# Patient Record
Sex: MALE | Race: OTHER | Employment: OTHER | URBAN - METROPOLITAN AREA
[De-identification: names, ages, dates, MRNs, and addresses within clinical notes are randomized per-mention and may not be internally consistent; named-entity substitution may affect disease eponyms.]

---

## 2019-04-04 ENCOUNTER — OFFICE VISIT (OUTPATIENT)
Dept: FAMILY MEDICINE CLINIC | Facility: CLINIC | Age: 69
End: 2019-04-04
Payer: MEDICARE

## 2019-04-04 VITALS
BODY MASS INDEX: 27.65 KG/M2 | DIASTOLIC BLOOD PRESSURE: 90 MMHG | SYSTOLIC BLOOD PRESSURE: 164 MMHG | RESPIRATION RATE: 17 BRPM | HEART RATE: 77 BPM | WEIGHT: 158 LBS | HEIGHT: 63.5 IN | OXYGEN SATURATION: 98 % | TEMPERATURE: 98 F

## 2019-04-04 DIAGNOSIS — Z12.11 SCREENING FOR COLON CANCER: ICD-10-CM

## 2019-04-04 DIAGNOSIS — I10 ESSENTIAL HYPERTENSION: ICD-10-CM

## 2019-04-04 DIAGNOSIS — E78.00 HYPERCHOLESTEROLEMIA: ICD-10-CM

## 2019-04-04 DIAGNOSIS — Z00.00 MEDICARE ANNUAL WELLNESS VISIT, SUBSEQUENT: Primary | ICD-10-CM

## 2019-04-04 DIAGNOSIS — Z12.5 PROSTATE CANCER SCREENING: ICD-10-CM

## 2019-04-04 PROCEDURE — 96160 PT-FOCUSED HLTH RISK ASSMT: CPT | Performed by: EMERGENCY MEDICINE

## 2019-04-04 PROCEDURE — 99397 PER PM REEVAL EST PAT 65+ YR: CPT | Performed by: EMERGENCY MEDICINE

## 2019-04-04 PROCEDURE — G0439 PPPS, SUBSEQ VISIT: HCPCS | Performed by: EMERGENCY MEDICINE

## 2019-04-04 RX ORDER — LISINOPRIL AND HYDROCHLOROTHIAZIDE 25; 20 MG/1; MG/1
1 TABLET ORAL DAILY
Qty: 30 TABLET | Refills: 0 | Status: SHIPPED | OUTPATIENT
Start: 2019-04-04 | End: 2019-04-25

## 2019-04-04 RX ORDER — ATORVASTATIN CALCIUM 10 MG/1
10 TABLET, FILM COATED ORAL DAILY
Qty: 90 TABLET | Refills: 0 | Status: SHIPPED | OUTPATIENT
Start: 2019-04-04 | End: 2019-10-21

## 2019-04-04 RX ORDER — ATORVASTATIN CALCIUM 10 MG/1
TABLET, FILM COATED ORAL
COMMUNITY
Start: 2018-11-09 | End: 2019-04-04

## 2019-04-04 RX ORDER — LISINOPRIL 10 MG/1
10 TABLET ORAL DAILY
Qty: 90 TABLET | Refills: 0 | Status: CANCELLED | OUTPATIENT
Start: 2019-04-04

## 2019-04-04 RX ORDER — LISINOPRIL 10 MG/1
TABLET ORAL
COMMUNITY
Start: 2018-11-19 | End: 2019-04-04

## 2019-04-04 NOTE — PROGRESS NOTES
HPI:   Rylan Tran is a 77year old male who presents for a MA (Medicare Advantage) [address] (Once per calendar year). HYPERTENSION  Ran out of meds. Prev on Lisinopril 10 mg daily. No home BP monitoring. No cough.   No meds x 1 month  No couch  No C labs)   No results found for: WBC, HGB, PLT     ALLERGIES:   He is allergic to seasonal.    CURRENT MEDICATIONS:     Outpatient Medications Marked as Taking for the 4/4/19 encounter (Office Visit) with Azeem Perez MD:  atorvastatin 10 MG Oral Tab Ta midline, mucosa normal, no drainage or sinus tenderness   Throat: Lips, mucosa, and tongue normal; teeth and gums normal   Neck: Supple, symmetrical, trachea midline, no adenopathy, thyroid: not enlarged, symmetric, no tenderness/mass/nodules, no carotid b a day and record  Recommend SHINGRIX vaccine for shingles x 2 doses, check with local pharmacy  Have blood tests done  Follow up in 2-3 weeks for recheck of BP  Low salt diet        The patient indicates understanding of these issues and agrees to the plan Influenza  Covered Annually    Please get every year    Pneumococcal 13 (Prevnar)  Covered Once after 65 No vaccine history found Please get once after your 65th birthday    Pneumococcal 23 (Pneumovax)  Covered Once after 65 No vaccine history found Please

## 2019-04-04 NOTE — PATIENT INSTRUCTIONS
Thank you for choosing [facility]  To Do:  FOR MARCIN [address] for Colonoscopy, Dr. Facundo Iyer BP monitoring once a day and record  Recommend [facility] vaccine for shingles x 2 doses, check with local pharmacy  Have blood tests done sigmoidoscopy every 5 years, or colonoscopy every 10 years, or double-contrast barium enema every 5 years; yearly fecal occult blood test or fecal immunochemical test; or a stool DNA test as often as your healthcare provider advises; talk with your healthc Hepatitis A Men at increased risk for infection – talk with your healthcare provider 2 doses given at least 6 months apart   Hepatitis B Men at increased risk for infection – talk with your healthcare provider 3 doses over 6 months; second dose should be

## 2019-04-08 ENCOUNTER — TELEPHONE (OUTPATIENT)
Dept: FAMILY MEDICINE CLINIC | Facility: CLINIC | Age: 69
End: 2019-04-08

## 2019-04-08 NOTE — TELEPHONE ENCOUNTER
Patient signed medical records authorization form for the below Facility to disclose health information to [facility]:      [facility] Phone: [phone]  Facility Fax: [phone]    ELAINE sent to scanning.  Fax confirmation 4/5

## 2019-04-25 ENCOUNTER — OFFICE VISIT (OUTPATIENT)
Dept: FAMILY MEDICINE CLINIC | Facility: CLINIC | Age: 69
End: 2019-04-25
Payer: MEDICARE

## 2019-04-25 ENCOUNTER — LAB ENCOUNTER (OUTPATIENT)
Dept: LAB | Age: 69
End: 2019-04-25
Attending: EMERGENCY MEDICINE
Payer: MEDICARE

## 2019-04-25 VITALS
DIASTOLIC BLOOD PRESSURE: 60 MMHG | SYSTOLIC BLOOD PRESSURE: 128 MMHG | RESPIRATION RATE: 15 BRPM | HEART RATE: 81 BPM | BODY MASS INDEX: 27.12 KG/M2 | WEIGHT: 155 LBS | HEIGHT: 63.5 IN | OXYGEN SATURATION: 98 %

## 2019-04-25 DIAGNOSIS — I10 ESSENTIAL HYPERTENSION: ICD-10-CM

## 2019-04-25 DIAGNOSIS — I10 ESSENTIAL HYPERTENSION: Primary | ICD-10-CM

## 2019-04-25 DIAGNOSIS — Z12.5 PROSTATE CANCER SCREENING: ICD-10-CM

## 2019-04-25 DIAGNOSIS — Z23 NEED FOR VACCINATION FOR STREP PNEUMONIAE: ICD-10-CM

## 2019-04-25 DIAGNOSIS — E78.00 HYPERCHOLESTEROLEMIA: ICD-10-CM

## 2019-04-25 PROCEDURE — 90670 PCV13 VACCINE IM: CPT | Performed by: EMERGENCY MEDICINE

## 2019-04-25 PROCEDURE — 99213 OFFICE O/P EST LOW 20 MIN: CPT | Performed by: EMERGENCY MEDICINE

## 2019-04-25 PROCEDURE — 80061 LIPID PANEL: CPT

## 2019-04-25 PROCEDURE — 36415 COLL VENOUS BLD VENIPUNCTURE: CPT

## 2019-04-25 PROCEDURE — G0009 ADMIN PNEUMOCOCCAL VACCINE: HCPCS | Performed by: EMERGENCY MEDICINE

## 2019-04-25 PROCEDURE — 80053 COMPREHEN METABOLIC PANEL: CPT

## 2019-04-25 PROCEDURE — 84443 ASSAY THYROID STIM HORMONE: CPT

## 2019-04-25 PROCEDURE — 85025 COMPLETE CBC W/AUTO DIFF WBC: CPT

## 2019-04-25 RX ORDER — LISINOPRIL AND HYDROCHLOROTHIAZIDE 25; 20 MG/1; MG/1
1 TABLET ORAL DAILY
Qty: 90 TABLET | Refills: 1 | Status: SHIPPED | OUTPATIENT
Start: 2019-04-25 | End: 2019-10-21

## 2019-04-25 NOTE — PATIENT INSTRUCTIONS
Thank you for choosing [facility]  To Do:  FOR MARCIN KEENAN     · Follow up in 6 months  · Continue with BP monitoring 1-2 times a week or as needed  · Low salt diet  · Pneumonia shot today  · Flu shot in the fall  · Continue with Atorvastatin f actividad física de moderada a intensa. · Si le barraza dado nitroglicerina, asegúrese de llevarla cuando hace ejercicio.   · Si tiene un ataque de angina de pecho mientras está haciendo ejercicio, pare el ejercicio, tome la nitroglicerina y llame al médico. más que nada en los alimentos procesados. · Coma menos sodio (sal). Consumir demasiada sal puede subirle la presión arterial. Reduzca la cantidad de sodio que ingiere a 2,300 mg diarios o menos según le recomiende caraballo proveedor de Jefferson Hanks.  Patricia Mackey a Intente comer al [facility] porciones a la semana de algún pescado graso, amparo salmón, mary, caballa, [place] arcoíris y atún albacora (mclaughlin). Nilam tipo de pescados contienen ácidos grasos omega-3, los cuales son especialmente buenos para caraballo corazón.  L de christina. Colesterol  ¿Tiene nilam análisis otros nombres? Colesterol total en elizabeth, colesterol en angelo. ¿Qué es nilam análisis?   Nilam análisis mide la cantidad de colesterol que hay en caraballo elizabeth, para ayudar a acraballo proveedor de [employer] a d presión arterial johnny, diabetes o fumar cigarrillos. · Tiene nica afección que puede estar estrechamente vinculada con un alto nivel de colesterol, incluidas la diabetes; el alcoholismo; y las enfermedades de la tiroides, el hígado y los riñones.   · Sigue nivel normal de colesterol. Tenga en cuenta que caraballo riesgo de enfermedad cardíaca basado en caraballo nivel de colesterol total depende en gran medida de hari niveles de colesterol HDL y LDL, y de otros factores de riesgo.   El alto nivel de colesterol puede estar no necesitan receta y cualquier droga ilícita que pueda usar.    © 0924-3568 [employer] [address]. [address] [facility], [facility] [facility]. Todos los derechos reservados.  Esta información no pretende sustituir la atención médica profesional. Sólo caraballo aire caliente  · [place] o semillas sin sal  Evite comer:  · Tartas y pasteles  · Mezclas para postres ya preparadas  · Budines en conserva o empacados  · Pretzels, shraddha fritas, galletas saladas y nueces (a menos que la etiqueta indique que no contienen sa hace demasiada fuerza contra las vail de las arterias al viajar por los vasos sanguíneos. Eso daña el recubrimiento de las arterias. Por lo general, está en riesgo si tiene:  · Veronique presión arterial de 120/80 o superior.  Puede que caraballo médico le indique va sentimientos no ocasionan directamente nica enfermedad cardíaca, olivia afectan la christina en general y [address] calidad de rene.   Date Last Reviewed: 6/1/2013  © 9020-6426 [employer] [address]. 1407 [facility], [address] [place] [address] [place].  Todos los dere sanguíneos. Niveles normales de colesterol  Usted puede determinar cuál es caraballo nivel de lípidos mediante un análisis de Emmy. Un nivel alto de colesterol en elizabeth es un factor de riesgo para desarrollar nica enfermedad cardiovascular aterosclerótica.  Brandan Patton triglicéridos. · Resinas (llamadas también secuestradores de ácidos biliares). Las resinas promueven un aumento de la eliminación del colesterol a través del intestino.  Funcionan uniéndose con la bilis (nica sustancia que ayuda al organismo a digerir la co medicamentos se administran mediante nica inyección y se Citizen of the [place] para personas que tienen nica forma hereditaria de colesterol alto (hipercolesterolemia).  También se usan en personas a quienes les resulta difícil controlar el colesterol con otros medicament los efectos secundarios que le causen. Jan vez deba mariposa más de un medicamento para lograr las metas de colesterol que acuerde con caraballo proveedor. ¿Cómo mariposa los medicamentos para el colesterol?   [place] los medicamentos siguiendo exactamente las instruccion los ojos, visión borrosa, lizett musculares y problemas para respirar deben comunicarse al proveedor de atención médica de inmediato.   Nota: Si usted está embarazada o [place], dígaselo al proveedor de atención médica antes de mariposa medicamentos para de moderada a enérgica incluyen:  · Caminar a ritmo enérgico (entre katelyn y cuatro millas [entre unos cuatro y seis km] por hora)  · Trotar o correr  · Nadar o hacer actividad [place] en el agua  · Hacer senderismo  · Bailar  · Practicar artes marciales  · puede diagnosticar y tratar un problema de christina.

## 2019-04-25 NOTE — PROGRESS NOTES
Chief Complaint:   Patient presents with:  Blood Pressure: Med f/u     HPI:   This is a 77year old male     Recheck BP. On Lisinopril HCTZ. BP much improved. No new Sx. Feels well.    No blood tests to review, will be drawn today  NO dizziness or light hea edema      No results found for this or any previous visit (from the past 336 hour(s)).         ASSESSMENT AND PLAN:   Diagnoses and all orders for this visit:    Essential hypertension  · Much improved today with combination lisinopril hydrochlorothiazide

## 2019-04-29 ENCOUNTER — TELEPHONE (OUTPATIENT)
Dept: FAMILY MEDICINE CLINIC | Facility: CLINIC | Age: 69
End: 2019-04-29

## 2019-04-29 DIAGNOSIS — E78.00 HYPERCHOLESTEROLEMIA: ICD-10-CM

## 2019-04-29 DIAGNOSIS — N28.9 ABNORMAL KIDNEY FUNCTION: Primary | ICD-10-CM

## 2019-04-29 NOTE — TELEPHONE ENCOUNTER
Left detailed VM for pt regarding results and instructions listed below. Pt instructed to call back with any further questions/concerns. *pt provided with lab hours and scheduling number.

## 2019-04-29 NOTE — TELEPHONE ENCOUNTER
----- Message from Ismael Maya MD sent at 4/29/2019  9:27 AM CDT -----  Creatinine and GFR a little low  Repeat BMP in 1 month  Lipids stable, continue with atorvastatin  Rest of labs stable

## 2019-05-06 ENCOUNTER — OFFICE VISIT (OUTPATIENT)
Dept: SURGERY | Facility: CLINIC | Age: 69
End: 2019-05-06
Payer: MEDICARE

## 2019-05-06 VITALS
SYSTOLIC BLOOD PRESSURE: 122 MMHG | HEART RATE: 77 BPM | BODY MASS INDEX: 28.52 KG/M2 | HEIGHT: 62 IN | WEIGHT: 155 LBS | DIASTOLIC BLOOD PRESSURE: 86 MMHG

## 2019-05-06 DIAGNOSIS — Z12.11 ENCOUNTER FOR SCREENING COLONOSCOPY: Primary | ICD-10-CM

## 2019-05-06 DIAGNOSIS — I10 ESSENTIAL HYPERTENSION: ICD-10-CM

## 2019-05-06 PROCEDURE — S0285 CNSLT BEFORE SCREEN COLONOSC: HCPCS | Performed by: COLON & RECTAL SURGERY

## 2019-05-06 RX ORDER — POLYETHYLENE GLYCOL 3350, SODIUM CHLORIDE, SODIUM BICARBONATE, POTASSIUM CHLORIDE 420; 11.2; 5.72; 1.48 G/4L; G/4L; G/4L; G/4L
POWDER, FOR SOLUTION ORAL
Qty: 1 BOTTLE | Refills: 0 | Status: SHIPPED | OUTPATIENT
Start: 2019-05-06 | End: 2019-06-24 | Stop reason: ALTCHOICE

## 2019-05-06 NOTE — PATIENT INSTRUCTIONS
Assessment   Encounter for screening colonoscopy  (primary encounter diagnosis)  Essential hypertension      Plan   The patient is 77year old and has had a colonoscopy. The patient's family history is negative for Colorectal cancer.  The patient does not h

## 2019-05-06 NOTE — H&P
New Patient Visit Note       Active Problems      1. Encounter for screening colonoscopy    2.  Essential hypertension        Chief Complaint   Patient presents with:  Colonoscopy: No current complaints      History of Present Illness   Nena Skelton is a facility-administered medications on file prior to visit. Review of Systems  Review of Systems   Constitutional: Negative for chills, diaphoresis, fatigue and fever. HENT: Negative for rhinorrhea, sore throat and trouble swallowing.     Eyes: Delia Fuss diagnosis)  Essential hypertension      Plan   The patient is 77year old and has had a colonoscopy. The patient's family history is negative for Colorectal cancer. The patient does not have gastrointestinal symptoms.  The patient is at average risk for col

## 2019-05-28 ENCOUNTER — LAB REQUISITION (OUTPATIENT)
Dept: LAB | Facility: HOSPITAL | Age: 69
End: 2019-05-28
Payer: MEDICARE

## 2019-05-28 DIAGNOSIS — Z12.11 ENCOUNTER FOR SCREENING FOR MALIGNANT NEOPLASM OF COLON: ICD-10-CM

## 2019-05-28 PROCEDURE — 88305 TISSUE EXAM BY PATHOLOGIST: CPT | Performed by: COLON & RECTAL SURGERY

## 2019-05-31 NOTE — PROGRESS NOTES
Called pt to schedule an appt. Pt was out of the house and not by a calendar. States he will also be going out of town soon as well. Pt states he will call back later today once he gets home and has a calendar in front of him to make an appt.

## 2019-06-24 ENCOUNTER — OFFICE VISIT (OUTPATIENT)
Dept: SURGERY | Facility: CLINIC | Age: 69
End: 2019-06-24
Payer: MEDICARE

## 2019-06-24 VITALS
HEART RATE: 80 BPM | WEIGHT: 152 LBS | BODY MASS INDEX: 26.6 KG/M2 | SYSTOLIC BLOOD PRESSURE: 119 MMHG | TEMPERATURE: 98 F | HEIGHT: 63.5 IN | DIASTOLIC BLOOD PRESSURE: 73 MMHG

## 2019-06-24 DIAGNOSIS — K57.30 DIVERTICULOSIS OF LARGE INTESTINE WITHOUT HEMORRHAGE: ICD-10-CM

## 2019-06-24 DIAGNOSIS — K64.2 GRADE III INTERNAL HEMORRHOIDS: ICD-10-CM

## 2019-06-24 DIAGNOSIS — D12.6 TUBULAR ADENOMA OF COLON: Primary | ICD-10-CM

## 2019-06-24 PROCEDURE — 99212 OFFICE O/P EST SF 10 MIN: CPT | Performed by: COLON & RECTAL SURGERY

## 2019-06-24 NOTE — PATIENT INSTRUCTIONS
Assessment   Tubular adenoma of colon  (primary encounter diagnosis)  Diverticulosis of large intestine without hemorrhage  Grade III internal hemorrhoids      Plan   The patient is doing well after colonoscopy.     Findings include  ascending colon tubular

## 2019-06-24 NOTE — PROGRESS NOTES
Office Visit Note       Active Problems  1. Tubular adenoma of colon    2. Diverticulosis of large intestine without hemorrhage    3.  Grade III internal hemorrhoids         Chief Complaint   Patient presents with:  Test Results: No current complaints Eyes: Negative for visual disturbance. Respiratory: Negative for shortness of breath and wheezing. Cardiovascular: Negative for chest pain and palpitations.    Gastrointestinal: Negative for abdominal distention, abdominal pain, anal bleeding, blood Ordering Location:     [employer]        Received:            05/29/2019 08:39 AM         Pathologist:          Maritza Bhatt MD                                                       Specimens:   A) - Appendix, Martha appendiceal poly

## 2019-09-25 ENCOUNTER — PATIENT OUTREACH (OUTPATIENT)
Dept: CASE MANAGEMENT | Age: 69
End: 2019-09-25

## 2019-09-25 NOTE — PROGRESS NOTES
Attempted contacting pt to intro CCM services with no answer left voicemail to call back. Will continue contacting to discuss.

## 2019-10-21 ENCOUNTER — OFFICE VISIT (OUTPATIENT)
Dept: FAMILY MEDICINE CLINIC | Facility: CLINIC | Age: 69
End: 2019-10-21
Payer: MEDICARE

## 2019-10-21 VITALS
OXYGEN SATURATION: 97 % | RESPIRATION RATE: 17 BRPM | HEART RATE: 79 BPM | BODY MASS INDEX: 27 KG/M2 | DIASTOLIC BLOOD PRESSURE: 84 MMHG | WEIGHT: 153 LBS | SYSTOLIC BLOOD PRESSURE: 138 MMHG

## 2019-10-21 DIAGNOSIS — E78.00 HYPERCHOLESTEROLEMIA: Primary | ICD-10-CM

## 2019-10-21 DIAGNOSIS — Z23 NEEDS FLU SHOT: ICD-10-CM

## 2019-10-21 DIAGNOSIS — I10 ESSENTIAL HYPERTENSION: ICD-10-CM

## 2019-10-21 PROCEDURE — 90662 IIV NO PRSV INCREASED AG IM: CPT | Performed by: EMERGENCY MEDICINE

## 2019-10-21 PROCEDURE — 99214 OFFICE O/P EST MOD 30 MIN: CPT | Performed by: EMERGENCY MEDICINE

## 2019-10-21 PROCEDURE — G0008 ADMIN INFLUENZA VIRUS VAC: HCPCS | Performed by: EMERGENCY MEDICINE

## 2019-10-21 RX ORDER — ATORVASTATIN CALCIUM 10 MG/1
10 TABLET, FILM COATED ORAL DAILY
Qty: 90 TABLET | Refills: 0 | Status: SHIPPED | OUTPATIENT
Start: 2019-10-21 | End: 2020-02-12

## 2019-10-21 RX ORDER — LISINOPRIL AND HYDROCHLOROTHIAZIDE 25; 20 MG/1; MG/1
1 TABLET ORAL DAILY
Qty: 90 TABLET | Refills: 1 | Status: SHIPPED | OUTPATIENT
Start: 2019-10-21 | End: 2020-02-12

## 2019-10-21 NOTE — PATIENT INSTRUCTIONS
Thank you for choosing [facility]  To Do:  FOR MARCIN KEENAN    · Follow up in 6 months  · Continue with all medications  · Home blood pressure checks twice a week and record  · Have blood test done before next office visit.   · Recommend [facility], [address] [employer] [place]. All rights reserved. This information is not intended as a substitute for professional medical care. Always follow your healthcare professional's instructions.         Step-by-Step  Checking Your Blood Pressure These can contain a lot of salt. · Eat 8 to 10 servings of fruits and vegetables every day. · Choose lean meats, fish, or chicken. · Eat whole-grain pasta, brown rice, and beans. · Eat 2 to 3 servings of low-fat or fat-free dairy products.   · Ask your healthcare provider may prescribe high blood pressure medicine. Take all medicines as prescribed. If you have any questions about your medicines, ask your healthcare provider before stopping or changing them.   Date Last Reviewed: 4/27/2016 © 2000-2019 The medicine exactly as directed. Don’t skip doses. Missing doses can cause your blood pressure to get out of control. · If you do miss a dose (or doses) check with your healthcare provider about what to do.   · Don't take medicines that contain heart stimulan manage stress. · Limit alcohol to no more than 1 drink a day for women and 2 drinks a day for men. Follow-up care  Make a follow-up appointment as directed.   When to call your healthcare provider  Call your healthcare provider right away if you have any

## 2019-10-21 NOTE — PROGRESS NOTES
Chief Complaint:   Patient presents with:  Blood Pressure: F/u     HPI:   This is a 68year old male     HYPERTENSION  No home BP checks. On Lisinopril/HCTZ. + good compliance.    No meds x 1 week d/t no refilll  NOn smoker    HYPERCHOLESTEROLEMIA  On swetha clear to auscultation, no RRW  CARDIO: RRR without murmur  EXTREMITIES: no cyanosis, clubbing or edema  GI:soft Nontender Normoactive BS. No palpable masses no guarding rigidity no rebound tenderness    ASSESSMENT AND PLAN:     1.  Hypercholesterolemia  Drena Gallery

## 2020-01-22 ENCOUNTER — TELEPHONE (OUTPATIENT)
Dept: FAMILY MEDICINE CLINIC | Facility: CLINIC | Age: 70
End: 2020-01-22

## 2020-02-12 DIAGNOSIS — I10 ESSENTIAL HYPERTENSION: ICD-10-CM

## 2020-02-12 DIAGNOSIS — E78.00 HYPERCHOLESTEROLEMIA: ICD-10-CM

## 2020-02-12 RX ORDER — LISINOPRIL AND HYDROCHLOROTHIAZIDE 25; 20 MG/1; MG/1
1 TABLET ORAL DAILY
Qty: 90 TABLET | Refills: 0 | Status: SHIPPED | OUTPATIENT
Start: 2020-02-12 | End: 2020-07-16

## 2020-02-12 RX ORDER — ATORVASTATIN CALCIUM 10 MG/1
10 TABLET, FILM COATED ORAL DAILY
Qty: 90 TABLET | Refills: 0 | Status: SHIPPED | OUTPATIENT
Start: 2020-02-12 | End: 2020-07-16

## 2020-02-12 NOTE — TELEPHONE ENCOUNTER
Last office visit:  10/21/19    Appointment scheduled with: Dr. Ida Rosa  on  04/21/2020    Requested medication: Lisinopril-hydroCHLOROthiazide 20-25 MG Oral Tab and atorvastatin 10 MG Oral Tab    Pharmacy:[facility] - [address]

## 2020-03-10 ENCOUNTER — APPOINTMENT (OUTPATIENT)
Dept: LAB | Age: 70
End: 2020-03-10
Attending: EMERGENCY MEDICINE
Payer: MEDICARE

## 2020-03-10 DIAGNOSIS — E78.00 HYPERCHOLESTEROLEMIA: ICD-10-CM

## 2020-03-10 DIAGNOSIS — N28.9 ABNORMAL KIDNEY FUNCTION: ICD-10-CM

## 2020-03-10 LAB
ALBUMIN SERPL-MCNC: 3.8 G/DL (ref 3.4–5)
ALBUMIN/GLOB SERPL: 0.9 {RATIO} (ref 1–2)
ALP LIVER SERPL-CCNC: 78 U/L (ref 45–117)
ALT SERPL-CCNC: 21 U/L (ref 16–61)
ANION GAP SERPL CALC-SCNC: 5 MMOL/L (ref 0–18)
AST SERPL-CCNC: 14 U/L (ref 15–37)
BILIRUB SERPL-MCNC: 0.5 MG/DL (ref 0.1–2)
BUN BLD-MCNC: 23 MG/DL (ref 7–18)
BUN/CREAT SERPL: 14.6 (ref 10–20)
CALCIUM BLD-MCNC: 9.1 MG/DL (ref 8.5–10.1)
CHLORIDE SERPL-SCNC: 102 MMOL/L (ref 98–112)
CHOLEST SMN-MCNC: 166 MG/DL (ref ?–200)
CO2 SERPL-SCNC: 28 MMOL/L (ref 21–32)
CREAT BLD-MCNC: 1.58 MG/DL (ref 0.7–1.3)
GLOBULIN PLAS-MCNC: 4.3 G/DL (ref 2.8–4.4)
GLUCOSE BLD-MCNC: 91 MG/DL (ref 70–99)
HDLC SERPL-MCNC: 43 MG/DL (ref 40–59)
LDLC SERPL CALC-MCNC: 67 MG/DL (ref ?–100)
M PROTEIN MFR SERPL ELPH: 8.1 G/DL (ref 6.4–8.2)
NONHDLC SERPL-MCNC: 123 MG/DL (ref ?–130)
OSMOLALITY SERPL CALC.SUM OF ELEC: 283 MOSM/KG (ref 275–295)
PATIENT FASTING Y/N/NP: YES
PATIENT FASTING Y/N/NP: YES
POTASSIUM SERPL-SCNC: 4.5 MMOL/L (ref 3.5–5.1)
SODIUM SERPL-SCNC: 135 MMOL/L (ref 136–145)
TRIGL SERPL-MCNC: 278 MG/DL (ref 30–149)
VLDLC SERPL CALC-MCNC: 56 MG/DL (ref 0–30)

## 2020-03-10 PROCEDURE — 80053 COMPREHEN METABOLIC PANEL: CPT

## 2020-03-10 PROCEDURE — 80061 LIPID PANEL: CPT

## 2020-03-10 PROCEDURE — 36415 COLL VENOUS BLD VENIPUNCTURE: CPT

## 2020-03-11 ENCOUNTER — TELEPHONE (OUTPATIENT)
Dept: FAMILY MEDICINE CLINIC | Facility: CLINIC | Age: 70
End: 2020-03-11

## 2020-03-11 NOTE — TELEPHONE ENCOUNTER
----- Message from Raven Story MD sent at 3/10/2020  5:46 PM CDT -----  Labs stable  Continue statin  Pt due for 6 month OV

## 2020-03-11 NOTE — TELEPHONE ENCOUNTER
Left message to inform patient of results and instructions. Asked patient to call office if any questions.

## 2020-04-17 DIAGNOSIS — E78.00 HYPERCHOLESTEROLEMIA: ICD-10-CM

## 2020-04-17 RX ORDER — ATORVASTATIN CALCIUM 10 MG/1
TABLET, FILM COATED ORAL
Qty: 90 TABLET | Refills: 0 | OUTPATIENT
Start: 2020-04-17

## 2020-04-20 ENCOUNTER — TELEPHONE (OUTPATIENT)
Dept: FAMILY MEDICINE CLINIC | Facility: CLINIC | Age: 70
End: 2020-04-20

## 2020-07-16 ENCOUNTER — OFFICE VISIT (OUTPATIENT)
Dept: FAMILY MEDICINE CLINIC | Facility: CLINIC | Age: 70
End: 2020-07-16
Payer: MEDICARE

## 2020-07-16 VITALS
TEMPERATURE: 98 F | WEIGHT: 147 LBS | OXYGEN SATURATION: 97 % | HEART RATE: 82 BPM | BODY MASS INDEX: 26.05 KG/M2 | DIASTOLIC BLOOD PRESSURE: 64 MMHG | RESPIRATION RATE: 16 BRPM | HEIGHT: 63 IN | SYSTOLIC BLOOD PRESSURE: 118 MMHG

## 2020-07-16 DIAGNOSIS — Z23 NEED FOR VACCINATION: ICD-10-CM

## 2020-07-16 DIAGNOSIS — N52.9 ERECTILE DYSFUNCTION, UNSPECIFIED ERECTILE DYSFUNCTION TYPE: ICD-10-CM

## 2020-07-16 DIAGNOSIS — I10 ESSENTIAL HYPERTENSION: ICD-10-CM

## 2020-07-16 DIAGNOSIS — Z13.228 SCREENING FOR ENDOCRINE, NUTRITIONAL, METABOLIC AND IMMUNITY DISORDER: ICD-10-CM

## 2020-07-16 DIAGNOSIS — Z13.29 SCREENING FOR ENDOCRINE, NUTRITIONAL, METABOLIC AND IMMUNITY DISORDER: ICD-10-CM

## 2020-07-16 DIAGNOSIS — Z00.00 ENCOUNTER FOR ANNUAL HEALTH EXAMINATION: Primary | ICD-10-CM

## 2020-07-16 DIAGNOSIS — N18.30 CKD (CHRONIC KIDNEY DISEASE) STAGE 3, GFR 30-59 ML/MIN (HCC): Chronic | ICD-10-CM

## 2020-07-16 DIAGNOSIS — E78.00 HYPERCHOLESTEROLEMIA: ICD-10-CM

## 2020-07-16 DIAGNOSIS — R42 DIZZINESS: ICD-10-CM

## 2020-07-16 DIAGNOSIS — Z13.21 SCREENING FOR ENDOCRINE, NUTRITIONAL, METABOLIC AND IMMUNITY DISORDER: ICD-10-CM

## 2020-07-16 DIAGNOSIS — Z23 NEED FOR VACCINATION FOR STREP PNEUMONIAE: ICD-10-CM

## 2020-07-16 DIAGNOSIS — Z11.59 NEED FOR HEPATITIS C SCREENING TEST: ICD-10-CM

## 2020-07-16 DIAGNOSIS — Z13.0 SCREENING FOR ENDOCRINE, NUTRITIONAL, METABOLIC AND IMMUNITY DISORDER: ICD-10-CM

## 2020-07-16 DIAGNOSIS — Z12.5 PROSTATE CANCER SCREENING: ICD-10-CM

## 2020-07-16 PROCEDURE — G0439 PPPS, SUBSEQ VISIT: HCPCS | Performed by: EMERGENCY MEDICINE

## 2020-07-16 PROCEDURE — 3008F BODY MASS INDEX DOCD: CPT | Performed by: EMERGENCY MEDICINE

## 2020-07-16 PROCEDURE — 90732 PPSV23 VACC 2 YRS+ SUBQ/IM: CPT | Performed by: EMERGENCY MEDICINE

## 2020-07-16 PROCEDURE — 3078F DIAST BP <80 MM HG: CPT | Performed by: EMERGENCY MEDICINE

## 2020-07-16 PROCEDURE — 99397 PER PM REEVAL EST PAT 65+ YR: CPT | Performed by: EMERGENCY MEDICINE

## 2020-07-16 PROCEDURE — G0009 ADMIN PNEUMOCOCCAL VACCINE: HCPCS | Performed by: EMERGENCY MEDICINE

## 2020-07-16 PROCEDURE — 3074F SYST BP LT 130 MM HG: CPT | Performed by: EMERGENCY MEDICINE

## 2020-07-16 PROCEDURE — 96160 PT-FOCUSED HLTH RISK ASSMT: CPT | Performed by: EMERGENCY MEDICINE

## 2020-07-16 RX ORDER — LISINOPRIL AND HYDROCHLOROTHIAZIDE 25; 20 MG/1; MG/1
1 TABLET ORAL DAILY
Qty: 90 TABLET | Refills: 0 | Status: CANCELLED | OUTPATIENT
Start: 2020-07-16

## 2020-07-16 RX ORDER — ATORVASTATIN CALCIUM 10 MG/1
10 TABLET, FILM COATED ORAL DAILY
Qty: 90 TABLET | Refills: 0 | Status: SHIPPED | OUTPATIENT
Start: 2020-07-16 | End: 2020-10-15

## 2020-07-16 RX ORDER — LISINOPRIL 20 MG/1
20 TABLET ORAL DAILY
Qty: 90 TABLET | Refills: 0 | Status: SHIPPED | OUTPATIENT
Start: 2020-07-16 | End: 2020-10-15

## 2020-07-16 NOTE — PATIENT INSTRUCTIONS
Thank you for choosing [facility]  To Do:  FOR MARCIN KEENAN        1. Stop hydrochlorothiazide. Take new prescriiption of Lisinopril 20 mg  2. Monitor BP daily and record  3. Follow up in 1 month for recheck  4.  Have blood tests done after fas No results found for this or any previous visit.  Limited to patients who meet one of the following criteria:   • Men who are 73-68 years old and have smoked more than 100 cigarettes in their lifetime   • Anyone with a family history    Colorectal Cancer Sc received Factor VIII or IX concentrates   Clients of institutions for the mentally retarded   Persons who live in the same house as a HepB virus carrier   Homosexual men   Illicit injectable drug abusers     Tetanus Toxoid- Only covered with a cut with met

## 2020-07-16 NOTE — PROGRESS NOTES
HPI:   Federico Jiménez is a 65year old male who presents for a Medicare Subsequent Annual Wellness visit (Pt already had Initial Annual Wellness).   Annual Physical due on 07/16/2021        HYPERTENSION  On Lisinpril/HCTZ  No home BP checks      HYPERCHO 152 lb (68.9 kg)     Last Cholesterol Labs:   Lab Results   Component Value Date    CHOLEST 166 03/10/2020    HDL 43 03/10/2020    LDL 67 03/10/2020    TRIG 278 (H) 03/10/2020          Last Chemistry Labs:   Lab Results   Component Value Date    AST 14 (L) without obvious abnormality, atraumatic   Eyes:  PERRL, conjunctiva/corneas clear, EOM's intact, both eyes   Ears:  Normal TM's and external ear canals, both ears   Nose: Nares normal, septum midline, mucosa normal, no drainage or sinus tenderness   Throat atorvastatin 10 MG Oral Tab; Take 1 tablet (10 mg total) by mouth daily. Dispense: 90 tablet; Refill: 0  - COMP METABOLIC PANEL (14); Future  - LIPID PANEL; Future    4. Essential hypertension  Stable. Will discontinue hydrochlorothiazide.   Continue lillian Good  How do you maintain positive mental well-being?: Visiting Family    This section provided for quick review of chart, separate sheet to patient  [address] Internal Lab or Procedure External Lab or Procedure   Diab with a cut with metal- TD and TDaP Not covered by Medicare Part B) No vaccine history found This may be covered with your prescription benefits, but Medicare does not cover unless Medically needed    Zoster   Not covered by Medicare Part B No vaccine histo

## 2020-08-10 ENCOUNTER — LAB ENCOUNTER (OUTPATIENT)
Dept: LAB | Age: 70
End: 2020-08-10
Attending: EMERGENCY MEDICINE
Payer: MEDICARE

## 2020-08-10 DIAGNOSIS — E78.00 HYPERCHOLESTEROLEMIA: ICD-10-CM

## 2020-08-10 DIAGNOSIS — Z13.228 SCREENING FOR ENDOCRINE, NUTRITIONAL, METABOLIC AND IMMUNITY DISORDER: ICD-10-CM

## 2020-08-10 DIAGNOSIS — Z11.59 NEED FOR HEPATITIS C SCREENING TEST: ICD-10-CM

## 2020-08-10 DIAGNOSIS — Z12.5 PROSTATE CANCER SCREENING: ICD-10-CM

## 2020-08-10 DIAGNOSIS — Z13.21 SCREENING FOR ENDOCRINE, NUTRITIONAL, METABOLIC AND IMMUNITY DISORDER: ICD-10-CM

## 2020-08-10 DIAGNOSIS — I10 ESSENTIAL HYPERTENSION: ICD-10-CM

## 2020-08-10 DIAGNOSIS — R42 DIZZINESS: ICD-10-CM

## 2020-08-10 DIAGNOSIS — Z13.29 SCREENING FOR ENDOCRINE, NUTRITIONAL, METABOLIC AND IMMUNITY DISORDER: ICD-10-CM

## 2020-08-10 DIAGNOSIS — Z13.0 SCREENING FOR ENDOCRINE, NUTRITIONAL, METABOLIC AND IMMUNITY DISORDER: ICD-10-CM

## 2020-08-10 LAB
ALBUMIN SERPL-MCNC: 3.9 G/DL (ref 3.4–5)
ALBUMIN/GLOB SERPL: 1 {RATIO} (ref 1–2)
ALP LIVER SERPL-CCNC: 70 U/L (ref 45–117)
ALT SERPL-CCNC: 18 U/L (ref 16–61)
ANION GAP SERPL CALC-SCNC: 3 MMOL/L (ref 0–18)
AST SERPL-CCNC: 18 U/L (ref 15–37)
BASOPHILS # BLD AUTO: 0.02 X10(3) UL (ref 0–0.2)
BASOPHILS NFR BLD AUTO: 0.3 %
BILIRUB SERPL-MCNC: 0.7 MG/DL (ref 0.1–2)
BUN BLD-MCNC: 16 MG/DL (ref 7–18)
BUN/CREAT SERPL: 13.4 (ref 10–20)
CALCIUM BLD-MCNC: 8.8 MG/DL (ref 8.5–10.1)
CHLORIDE SERPL-SCNC: 109 MMOL/L (ref 98–112)
CHOLEST SMN-MCNC: 179 MG/DL (ref ?–200)
CO2 SERPL-SCNC: 27 MMOL/L (ref 21–32)
COMPLEXED PSA SERPL-MCNC: 1.67 NG/ML (ref ?–4)
CREAT BLD-MCNC: 1.19 MG/DL (ref 0.7–1.3)
DEPRECATED RDW RBC AUTO: 47.4 FL (ref 35.1–46.3)
EOSINOPHIL # BLD AUTO: 0.09 X10(3) UL (ref 0–0.7)
EOSINOPHIL NFR BLD AUTO: 1.5 %
ERYTHROCYTE [DISTWIDTH] IN BLOOD BY AUTOMATED COUNT: 14.5 % (ref 11–15)
GLOBULIN PLAS-MCNC: 3.8 G/DL (ref 2.8–4.4)
GLUCOSE BLD-MCNC: 97 MG/DL (ref 70–99)
HCT VFR BLD AUTO: 39.6 % (ref 39–53)
HCV AB SERPL QL IA: NONREACTIVE
HDLC SERPL-MCNC: 61 MG/DL (ref 40–59)
HGB BLD-MCNC: 13.1 G/DL (ref 13–17.5)
IMM GRANULOCYTES # BLD AUTO: 0.01 X10(3) UL (ref 0–1)
IMM GRANULOCYTES NFR BLD: 0.2 %
LDLC SERPL CALC-MCNC: 97 MG/DL (ref ?–100)
LYMPHOCYTES # BLD AUTO: 1.5 X10(3) UL (ref 1–4)
LYMPHOCYTES NFR BLD AUTO: 25.7 %
M PROTEIN MFR SERPL ELPH: 7.7 G/DL (ref 6.4–8.2)
MCH RBC QN AUTO: 29.6 PG (ref 26–34)
MCHC RBC AUTO-ENTMCNC: 33.1 G/DL (ref 31–37)
MCV RBC AUTO: 89.4 FL (ref 80–100)
MONOCYTES # BLD AUTO: 0.53 X10(3) UL (ref 0.1–1)
MONOCYTES NFR BLD AUTO: 9.1 %
NEUTROPHILS # BLD AUTO: 3.69 X10 (3) UL (ref 1.5–7.7)
NEUTROPHILS # BLD AUTO: 3.69 X10(3) UL (ref 1.5–7.7)
NEUTROPHILS NFR BLD AUTO: 63.2 %
NONHDLC SERPL-MCNC: 118 MG/DL (ref ?–130)
OSMOLALITY SERPL CALC.SUM OF ELEC: 289 MOSM/KG (ref 275–295)
PATIENT FASTING Y/N/NP: YES
PATIENT FASTING Y/N/NP: YES
PLATELET # BLD AUTO: 150 10(3)UL (ref 150–450)
POTASSIUM SERPL-SCNC: 4 MMOL/L (ref 3.5–5.1)
RBC # BLD AUTO: 4.43 X10(6)UL (ref 3.8–5.8)
SODIUM SERPL-SCNC: 139 MMOL/L (ref 136–145)
TRIGL SERPL-MCNC: 107 MG/DL (ref 30–149)
TSI SER-ACNC: 1.41 MIU/ML (ref 0.36–3.74)
VLDLC SERPL CALC-MCNC: 21 MG/DL (ref 0–30)
WBC # BLD AUTO: 5.8 X10(3) UL (ref 4–11)

## 2020-08-10 PROCEDURE — 85025 COMPLETE CBC W/AUTO DIFF WBC: CPT

## 2020-08-10 PROCEDURE — 80061 LIPID PANEL: CPT

## 2020-08-10 PROCEDURE — 80053 COMPREHEN METABOLIC PANEL: CPT

## 2020-08-10 PROCEDURE — 84443 ASSAY THYROID STIM HORMONE: CPT

## 2020-08-10 PROCEDURE — 36415 COLL VENOUS BLD VENIPUNCTURE: CPT

## 2020-08-10 PROCEDURE — 86803 HEPATITIS C AB TEST: CPT

## 2020-08-13 ENCOUNTER — TELEPHONE (OUTPATIENT)
Dept: FAMILY MEDICINE CLINIC | Facility: CLINIC | Age: 70
End: 2020-08-13

## 2020-08-13 NOTE — TELEPHONE ENCOUNTER
----- Message from Beatrice Sagastume MD sent at 8/12/2020 10:11 PM CDT -----  All labs stable  Continue with statin  Follow up as directed

## 2020-08-17 ENCOUNTER — OFFICE VISIT (OUTPATIENT)
Dept: FAMILY MEDICINE CLINIC | Facility: CLINIC | Age: 70
End: 2020-08-17
Payer: MEDICARE

## 2020-08-17 VITALS
RESPIRATION RATE: 17 BRPM | BODY MASS INDEX: 26 KG/M2 | DIASTOLIC BLOOD PRESSURE: 74 MMHG | OXYGEN SATURATION: 97 % | TEMPERATURE: 97 F | WEIGHT: 148 LBS | HEART RATE: 78 BPM | SYSTOLIC BLOOD PRESSURE: 130 MMHG

## 2020-08-17 DIAGNOSIS — N52.9 ERECTILE DYSFUNCTION, UNSPECIFIED ERECTILE DYSFUNCTION TYPE: ICD-10-CM

## 2020-08-17 DIAGNOSIS — I10 ESSENTIAL HYPERTENSION: Primary | ICD-10-CM

## 2020-08-17 PROCEDURE — 99214 OFFICE O/P EST MOD 30 MIN: CPT | Performed by: EMERGENCY MEDICINE

## 2020-08-17 PROCEDURE — 3078F DIAST BP <80 MM HG: CPT | Performed by: EMERGENCY MEDICINE

## 2020-08-17 PROCEDURE — 3075F SYST BP GE 130 - 139MM HG: CPT | Performed by: EMERGENCY MEDICINE

## 2020-08-17 RX ORDER — SILDENAFIL 50 MG/1
TABLET, FILM COATED ORAL
Qty: 30 TABLET | Refills: 1 | Status: SHIPPED | OUTPATIENT
Start: 2020-08-17 | End: 2021-01-07

## 2020-08-17 NOTE — PROGRESS NOTES
Chief Complaint:   Patient presents with:  Blood Pressure: F/u     HPI:   This is a 65year old male         HYPERTENSION    Currently on lisinopril only. HCTZ d/c because of reports of dizziness.    States that the dizziness has resolved and he feels sig stated age, well groomed.   GENERAL: well developed, well nourished, well hydrated, no distress  SKIN: good skin turgor, no obvious rashes  HEENT: atraumatic, normocephalic, ears, nose and throat are clear  EYES: sclera non icteric bilateral  NECK: supple, Time: 08/10/20  9:50 AM   Result Value Ref Range    WBC 5.8 4.0 - 11.0 x10(3) uL    RBC 4.43 3.80 - 5.80 x10(6)uL    HGB 13.1 13.0 - 17.5 g/dL    HCT 39.6 39.0 - 53.0 %    .0 150.0 - 450.0 10(3)uL    MCV 89.4 80.0 - 100.0 fL    MCH 29.6 26.0 - 34.0

## 2020-08-17 NOTE — PATIENT INSTRUCTIONS
Thank you for choosing [address] [place] [address] Group  To Do:  FOR MARCIN KEENAN        1. Continue with all medications  2. MOnitor BP at home at least 1 x a week  3. Follow up in 5- 6 months for recheck BP  4.  Low salt diet                          Alimentos con secos o semillas sin sal  Evite comer lo siguiente:  · Tartas y pasteles  · Premezclas comerciales para postres  · Pizza  · Budines en conserva o envasados  · Pretzels, shraddha fritas, galletas saladas y tabatha secos (a menos que la etiqueta indique que no c medicamentos para la disfunción eréctil con otros tratamientos, a menos que caraballo proveedor le sugiera hacerlo. · No tome medicamentos para la disfunción eréctil con mayor frecuencia o en dosis más elevadas que lo indicado en la receta.   · Mencione a caraballo prov estos medicamentos. · Si tiene el corazón debilitado debido a un infarto o nica enfermedad cardíaca, y no ha tenido relaciones sexuales tierra algún tiempo, reanudarlas podría aumentar el esfuerzo que debe hacer caraballo corazón.  Hable con caraballo proveedor de atenc

## 2020-10-15 DIAGNOSIS — I10 ESSENTIAL HYPERTENSION: ICD-10-CM

## 2020-10-15 DIAGNOSIS — E78.00 HYPERCHOLESTEROLEMIA: ICD-10-CM

## 2020-10-15 RX ORDER — ATORVASTATIN CALCIUM 10 MG/1
10 TABLET, FILM COATED ORAL DAILY
Qty: 90 TABLET | Refills: 0 | Status: SHIPPED | OUTPATIENT
Start: 2020-10-15 | End: 2021-01-07

## 2020-10-15 RX ORDER — LISINOPRIL 20 MG/1
20 TABLET ORAL DAILY
Qty: 90 TABLET | Refills: 0 | Status: SHIPPED | OUTPATIENT
Start: 2020-10-15 | End: 2021-01-07 | Stop reason: ALTCHOICE

## 2020-10-15 RX ORDER — LISINOPRIL AND HYDROCHLOROTHIAZIDE 25; 20 MG/1; MG/1
TABLET ORAL
Qty: 90 TABLET | Refills: 0 | Status: SHIPPED | OUTPATIENT
Start: 2020-10-15 | End: 2021-01-07

## 2020-12-17 ENCOUNTER — OFFICE VISIT (OUTPATIENT)
Dept: FAMILY MEDICINE CLINIC | Facility: CLINIC | Age: 70
End: 2020-12-17
Payer: MEDICARE

## 2020-12-17 VITALS
BODY MASS INDEX: 27.6 KG/M2 | HEIGHT: 62 IN | HEART RATE: 84 BPM | OXYGEN SATURATION: 99 % | SYSTOLIC BLOOD PRESSURE: 143 MMHG | TEMPERATURE: 97 F | WEIGHT: 150 LBS | DIASTOLIC BLOOD PRESSURE: 87 MMHG | RESPIRATION RATE: 20 BRPM

## 2020-12-17 DIAGNOSIS — L42 PITYRIASIS ROSEA: Primary | ICD-10-CM

## 2020-12-17 PROCEDURE — 3077F SYST BP >= 140 MM HG: CPT | Performed by: NURSE PRACTITIONER

## 2020-12-17 PROCEDURE — 3079F DIAST BP 80-89 MM HG: CPT | Performed by: NURSE PRACTITIONER

## 2020-12-17 PROCEDURE — 99212 OFFICE O/P EST SF 10 MIN: CPT | Performed by: NURSE PRACTITIONER

## 2020-12-17 PROCEDURE — 3008F BODY MASS INDEX DOCD: CPT | Performed by: NURSE PRACTITIONER

## 2020-12-17 NOTE — PATIENT INSTRUCTIONS
Pityriasis Rosea  Pityriasis rosea is a type of skin rash. It starts with one large round or oval scaly patch called the herald patch, and then causes many more small patches. The rash most often appears on the chest, back, and belly.  It can take 1 to 2 · Signs of infection in the skin (increasing redness, drainage of pus, yellow-brown scabs)  · Fever or other symptoms of a new illness  Paula last reviewed this educational content on 7/1/2019  © 0859-1570 [employer] [address].  [facility] The rash should go away without treatment, but it can take 4 to 8 weeks or longer. You can treat your itching with any of these:   · Corticosteroid cream or ointment. You can apply this medicine to the rash 2 to 3 times a day, for 2 to 3 weeks.   · Wendi

## 2021-01-07 ENCOUNTER — OFFICE VISIT (OUTPATIENT)
Dept: FAMILY MEDICINE CLINIC | Facility: CLINIC | Age: 71
End: 2021-01-07
Payer: MEDICARE

## 2021-01-07 VITALS
WEIGHT: 153 LBS | SYSTOLIC BLOOD PRESSURE: 128 MMHG | OXYGEN SATURATION: 99 % | HEART RATE: 59 BPM | DIASTOLIC BLOOD PRESSURE: 82 MMHG | RESPIRATION RATE: 17 BRPM | BODY MASS INDEX: 28 KG/M2 | TEMPERATURE: 97 F

## 2021-01-07 DIAGNOSIS — N52.9 ERECTILE DYSFUNCTION, UNSPECIFIED ERECTILE DYSFUNCTION TYPE: ICD-10-CM

## 2021-01-07 DIAGNOSIS — I10 ESSENTIAL HYPERTENSION: ICD-10-CM

## 2021-01-07 DIAGNOSIS — E78.00 HYPERCHOLESTEROLEMIA: ICD-10-CM

## 2021-01-07 DIAGNOSIS — R21 ATYPICAL RASH: Primary | ICD-10-CM

## 2021-01-07 PROCEDURE — 3079F DIAST BP 80-89 MM HG: CPT | Performed by: EMERGENCY MEDICINE

## 2021-01-07 PROCEDURE — 3074F SYST BP LT 130 MM HG: CPT | Performed by: EMERGENCY MEDICINE

## 2021-01-07 PROCEDURE — 99214 OFFICE O/P EST MOD 30 MIN: CPT | Performed by: EMERGENCY MEDICINE

## 2021-01-07 RX ORDER — TRIAMCINOLONE ACETONIDE 5 MG/G
1 CREAM TOPICAL 2 TIMES DAILY
Qty: 30 G | Refills: 0 | Status: SHIPPED | OUTPATIENT
Start: 2021-01-07

## 2021-01-07 RX ORDER — LISINOPRIL AND HYDROCHLOROTHIAZIDE 25; 20 MG/1; MG/1
1 TABLET ORAL DAILY
Qty: 90 TABLET | Refills: 0 | Status: SHIPPED | OUTPATIENT
Start: 2021-01-07 | End: 2021-04-21

## 2021-01-07 RX ORDER — SILDENAFIL 50 MG/1
TABLET, FILM COATED ORAL
Qty: 30 TABLET | Refills: 1 | Status: SHIPPED | OUTPATIENT
Start: 2021-01-07 | End: 2021-06-09

## 2021-01-07 RX ORDER — ATORVASTATIN CALCIUM 10 MG/1
10 TABLET, FILM COATED ORAL DAILY
Qty: 90 TABLET | Refills: 0 | Status: SHIPPED | OUTPATIENT
Start: 2021-01-07 | End: 2021-04-21

## 2021-01-07 NOTE — PATIENT INSTRUCTIONS
Thank you for choosing [facility]  To Do:  FOR MARCIN KEENAN        1. Follow up with dermatology, Dr. Yuni Barba  2. Use cream to most itchy areas  3. Continue with all medications  4.  Have blood tests done before next visit in 1 month

## 2021-01-07 NOTE — PROGRESS NOTES
Chief Complaint:   Patient presents with:  Rash: F/u rash, not getting better     HPI:   This is a 65year old male       RASH  Complains of a rash for the past 3 to 4 weeks. Initially was seen at the walk-in clinic. Diagnosed with pityriasis rosacea. comprised of multiple papules and macules with large areas of excoriation.   HEENT: atraumatic, normocephalic, ears, nose and throat are clear  EYES: sclera non icteric bilateral  NECK: supple, no adenopathy, no thyromegaly  LUNGS: clear to auscultation, no

## 2021-01-12 ENCOUNTER — TELEPHONE (OUTPATIENT)
Dept: FAMILY MEDICINE CLINIC | Facility: CLINIC | Age: 71
End: 2021-01-12

## 2021-02-02 ENCOUNTER — MED REC SCAN ONLY (OUTPATIENT)
Dept: FAMILY MEDICINE CLINIC | Facility: CLINIC | Age: 71
End: 2021-02-02

## 2021-02-08 ENCOUNTER — TELEPHONE (OUTPATIENT)
Dept: FAMILY MEDICINE CLINIC | Facility: CLINIC | Age: 71
End: 2021-02-08

## 2021-02-08 ENCOUNTER — LAB ENCOUNTER (OUTPATIENT)
Dept: LAB | Age: 71
End: 2021-02-08
Attending: EMERGENCY MEDICINE
Payer: MEDICARE

## 2021-02-08 DIAGNOSIS — E78.00 HYPERCHOLESTEROLEMIA: Primary | ICD-10-CM

## 2021-02-08 DIAGNOSIS — E78.00 HYPERCHOLESTEROLEMIA: ICD-10-CM

## 2021-02-08 LAB
ALBUMIN SERPL-MCNC: 4.1 G/DL (ref 3.4–5)
ALBUMIN/GLOB SERPL: 1.1 {RATIO} (ref 1–2)
ALP LIVER SERPL-CCNC: 78 U/L
ALT SERPL-CCNC: 16 U/L
ANION GAP SERPL CALC-SCNC: 5 MMOL/L (ref 0–18)
AST SERPL-CCNC: 14 U/L (ref 15–37)
BILIRUB SERPL-MCNC: 0.5 MG/DL (ref 0.1–2)
BUN BLD-MCNC: 22 MG/DL (ref 7–18)
BUN/CREAT SERPL: 16.2 (ref 10–20)
CALCIUM BLD-MCNC: 9.4 MG/DL (ref 8.5–10.1)
CHLORIDE SERPL-SCNC: 106 MMOL/L (ref 98–112)
CHOLEST SMN-MCNC: 193 MG/DL (ref ?–200)
CO2 SERPL-SCNC: 27 MMOL/L (ref 21–32)
CREAT BLD-MCNC: 1.36 MG/DL
GLOBULIN PLAS-MCNC: 3.7 G/DL (ref 2.8–4.4)
GLUCOSE BLD-MCNC: 100 MG/DL (ref 70–99)
HDLC SERPL-MCNC: 61 MG/DL (ref 40–59)
LDLC SERPL CALC-MCNC: 114 MG/DL (ref ?–100)
M PROTEIN MFR SERPL ELPH: 7.8 G/DL (ref 6.4–8.2)
NONHDLC SERPL-MCNC: 132 MG/DL (ref ?–130)
OSMOLALITY SERPL CALC.SUM OF ELEC: 289 MOSM/KG (ref 275–295)
PATIENT FASTING Y/N/NP: YES
PATIENT FASTING Y/N/NP: YES
POTASSIUM SERPL-SCNC: 4.4 MMOL/L (ref 3.5–5.1)
SODIUM SERPL-SCNC: 138 MMOL/L (ref 136–145)
TRIGL SERPL-MCNC: 88 MG/DL (ref 30–149)
VLDLC SERPL CALC-MCNC: 18 MG/DL (ref 0–30)

## 2021-02-08 PROCEDURE — 36415 COLL VENOUS BLD VENIPUNCTURE: CPT

## 2021-02-08 PROCEDURE — 80061 LIPID PANEL: CPT

## 2021-02-08 PROCEDURE — 80053 COMPREHEN METABOLIC PANEL: CPT

## 2021-02-08 NOTE — TELEPHONE ENCOUNTER
Pt presented to office for blood work but there is no order. VORB from [id number] for lipid and CMP.

## 2021-02-15 ENCOUNTER — OFFICE VISIT (OUTPATIENT)
Dept: FAMILY MEDICINE CLINIC | Facility: CLINIC | Age: 71
End: 2021-02-15
Payer: MEDICARE

## 2021-02-15 VITALS
WEIGHT: 154 LBS | HEIGHT: 62 IN | RESPIRATION RATE: 17 BRPM | TEMPERATURE: 98 F | OXYGEN SATURATION: 97 % | HEART RATE: 77 BPM | SYSTOLIC BLOOD PRESSURE: 110 MMHG | DIASTOLIC BLOOD PRESSURE: 60 MMHG | BODY MASS INDEX: 28.34 KG/M2

## 2021-02-15 DIAGNOSIS — E78.00 HYPERCHOLESTEROLEMIA: ICD-10-CM

## 2021-02-15 DIAGNOSIS — Z00.00 ENCOUNTER FOR ANNUAL HEALTH EXAMINATION: Primary | ICD-10-CM

## 2021-02-15 DIAGNOSIS — N18.30 STAGE 3 CHRONIC KIDNEY DISEASE, UNSPECIFIED WHETHER STAGE 3A OR 3B CKD (HCC): ICD-10-CM

## 2021-02-15 DIAGNOSIS — I10 ESSENTIAL HYPERTENSION: ICD-10-CM

## 2021-02-15 PROCEDURE — 3008F BODY MASS INDEX DOCD: CPT | Performed by: EMERGENCY MEDICINE

## 2021-02-15 PROCEDURE — 3074F SYST BP LT 130 MM HG: CPT | Performed by: EMERGENCY MEDICINE

## 2021-02-15 PROCEDURE — G0439 PPPS, SUBSEQ VISIT: HCPCS | Performed by: EMERGENCY MEDICINE

## 2021-02-15 PROCEDURE — 3078F DIAST BP <80 MM HG: CPT | Performed by: EMERGENCY MEDICINE

## 2021-02-15 PROCEDURE — 96160 PT-FOCUSED HLTH RISK ASSMT: CPT | Performed by: EMERGENCY MEDICINE

## 2021-02-15 PROCEDURE — 99397 PER PM REEVAL EST PAT 65+ YR: CPT | Performed by: EMERGENCY MEDICINE

## 2021-02-15 RX ORDER — KETOCONAZOLE 20 MG/G
CREAM TOPICAL
COMMUNITY
Start: 2021-01-28

## 2021-02-15 NOTE — PROGRESS NOTES
HPI:   Stephy Santana is a 65year old male who presents for a MA (Medicare Advantage) [address] [facility] (Once per calendar year).       Annual Physical due on 02/15/2022        HYPERTENSION  ON Lisinopril-hydrochlorothiazide, No cough compliant with meds  No h 02/08/2021    ALT 16 02/08/2021    CA 9.4 02/08/2021    ALB 4.1 02/08/2021    TSH 1.410 08/10/2020    CREATSERUM 1.36 (H) 02/08/2021     (H) 02/08/2021        CBC  (most recent labs)   Lab Results   Component Value Date    WBC 5.8 08/10/2020    HGB asthma    EXAM:   /60   Pulse 77   Temp 97.8 °F (36.6 °C) (Temporal)   Resp 17   Ht 5' 2\" (1.575 m)   Wt 154 lb (69.9 kg)   SpO2 97%   BMI 28.17 kg/m²   Estimated body mass index is 28.17 kg/m² as calculated from the following:    Height as of this Assessment. PLAN SUMMARY:        1. Encounter for annual health examination    2. Stage 3 chronic kidney disease, unspecified whether stage 3a or 3b CKD  Stable    3. Essential hypertension  Stable  Continue with lisinopril-HCTZ    4.  Hypercholesterole No results found for this or any previous visit. No flowsheet data found. Fecal Occult Blood Annually No results found for: FOB No flowsheet data found.     Glaucoma Screening      Ophthalmology Visit Annually: Diabetics, FHx Glaucoma, AA>50, >

## 2021-02-15 NOTE — PATIENT INSTRUCTIONS
Thank you for choosing [address] [place] [address] Group  To Do:  FOR MARCIN KEENAN        1. Check blood pressure 2-3 x a week  2. Continue with all medication  3. Follow up in 6 months  4.  Recommend SHINGRIX vaccine for shingles x 2 doses, check with local pharmacy meet one of the following criteria:   • Men who are 73-68 years old and have smoked more than 100 cigarettes in their lifetime   • Anyone with a family history    Colorectal Cancer Screening Covered up to Age 76     Colonoscopy Screen   Covered every 10 ye mentally retarded   Persons who live in the same house as a HepB virus carrier   Homosexual men   Illicit injectable drug abusers     Tetanus Toxoid- Only covered with a cut with metal- TD and TDaP Not covered by Medicare Part B) No orders found for this o

## 2021-03-09 DIAGNOSIS — Z23 NEED FOR VACCINATION: ICD-10-CM

## 2021-04-20 DIAGNOSIS — I10 ESSENTIAL HYPERTENSION: ICD-10-CM

## 2021-04-20 DIAGNOSIS — E78.00 HYPERCHOLESTEROLEMIA: ICD-10-CM

## 2021-04-20 NOTE — TELEPHONE ENCOUNTER
Jacey Soliman with [facility] called requesting refills of:  atorvastatin 10 MG Oral Tab  And Lisinopril-hydroCHLOROthiazide 20-25 MG Oral Tab

## 2021-04-20 NOTE — TELEPHONE ENCOUNTER
Pt's [facility] pharmacy requesting for medication refill of Atorvastatin and Lisinopril-hydrochlorothiazide. Please approve or deny pending Rx. Thank you.    LOV:2/15/21  LF: 1/7/21

## 2021-04-21 RX ORDER — LISINOPRIL AND HYDROCHLOROTHIAZIDE 25; 20 MG/1; MG/1
1 TABLET ORAL DAILY
Qty: 90 TABLET | Refills: 0 | Status: SHIPPED | OUTPATIENT
Start: 2021-04-21 | End: 2021-06-08

## 2021-04-21 RX ORDER — ATORVASTATIN CALCIUM 10 MG/1
10 TABLET, FILM COATED ORAL DAILY
Qty: 90 TABLET | Refills: 0 | Status: SHIPPED | OUTPATIENT
Start: 2021-04-21 | End: 2021-06-08

## 2021-06-08 DIAGNOSIS — N52.9 ERECTILE DYSFUNCTION, UNSPECIFIED ERECTILE DYSFUNCTION TYPE: ICD-10-CM

## 2021-06-08 DIAGNOSIS — E78.00 HYPERCHOLESTEROLEMIA: ICD-10-CM

## 2021-06-08 DIAGNOSIS — I10 ESSENTIAL HYPERTENSION: ICD-10-CM

## 2021-06-08 RX ORDER — ATORVASTATIN CALCIUM 10 MG/1
10 TABLET, FILM COATED ORAL DAILY
Qty: 90 TABLET | Refills: 0 | Status: SHIPPED | OUTPATIENT
Start: 2021-06-08 | End: 2021-10-02

## 2021-06-08 RX ORDER — LISINOPRIL AND HYDROCHLOROTHIAZIDE 25; 20 MG/1; MG/1
1 TABLET ORAL DAILY
Qty: 90 TABLET | Refills: 0 | Status: SHIPPED | OUTPATIENT
Start: 2021-06-08 | End: 2021-10-02

## 2021-06-08 NOTE — TELEPHONE ENCOUNTER
Medication(s) to Refill:   Requested Prescriptions     Pending Prescriptions Disp Refills   • Sildenafil Citrate 50 MG Oral Tab 30 tablet 1     Sig: Take 0.5-1 tablets (25-50 mg total) by mouth daily as needed for Erectile Dysfunction.      Signed Prescript

## 2021-06-09 RX ORDER — SILDENAFIL 50 MG/1
TABLET, FILM COATED ORAL
Qty: 30 TABLET | Refills: 1 | Status: SHIPPED | OUTPATIENT
Start: 2021-06-09

## 2021-08-16 ENCOUNTER — OFFICE VISIT (OUTPATIENT)
Dept: FAMILY MEDICINE CLINIC | Facility: CLINIC | Age: 71
End: 2021-08-16
Payer: MEDICARE

## 2021-08-16 VITALS
RESPIRATION RATE: 15 BRPM | OXYGEN SATURATION: 96 % | BODY MASS INDEX: 26 KG/M2 | DIASTOLIC BLOOD PRESSURE: 78 MMHG | SYSTOLIC BLOOD PRESSURE: 110 MMHG | WEIGHT: 142 LBS | HEART RATE: 84 BPM

## 2021-08-16 DIAGNOSIS — N18.30 STAGE 3 CHRONIC KIDNEY DISEASE, UNSPECIFIED WHETHER STAGE 3A OR 3B CKD (HCC): ICD-10-CM

## 2021-08-16 DIAGNOSIS — E78.00 HYPERCHOLESTEROLEMIA: ICD-10-CM

## 2021-08-16 DIAGNOSIS — N52.9 ERECTILE DYSFUNCTION, UNSPECIFIED ERECTILE DYSFUNCTION TYPE: ICD-10-CM

## 2021-08-16 DIAGNOSIS — I10 ESSENTIAL HYPERTENSION: Primary | ICD-10-CM

## 2021-08-16 PROCEDURE — 99214 OFFICE O/P EST MOD 30 MIN: CPT | Performed by: EMERGENCY MEDICINE

## 2021-08-16 PROCEDURE — 3074F SYST BP LT 130 MM HG: CPT | Performed by: EMERGENCY MEDICINE

## 2021-08-16 PROCEDURE — 3078F DIAST BP <80 MM HG: CPT | Performed by: EMERGENCY MEDICINE

## 2021-08-16 NOTE — PROGRESS NOTES
Chief Complaint:   Patient presents with:  Blood Pressure: F/u     HPI:   This is a 66year old male         HYPERTENSION    On Lisinopril-hydrochlorothiazide  No Cough, no CP no SOB  Lost 6-7 lbs  Feels well    HYPERCHOLESTEROLEMIA  On Atorvastatin alfredo PROBLEM LIST     Patient Active Problem List:     Essential hypertension     Hypercholesterolemia     CKD (chronic kidney disease) stage 3, GFR 30-59 ml/min ([facility])     Erectile dysfunction        PHYSICAL EXAM:   /78   Pulse 84   Resp 15   Wt 14 after fasting for recheck cholesterol  5.  Low fat low cholesterol diet           FOLLOW UP: Feb 2021

## 2021-08-16 NOTE — PATIENT INSTRUCTIONS
Thank you for choosing [facility]  To Do:  FOR MARCIN KEENAN        1. Follow up in  February 2021 for medicare annual wellness sooner if needed  2. Continue with all medications  3. Regular Blood pressure check 1-2 x a weeks  4.  Have blood wen and sauces  · Olives, pickles, and relish  · Bottled salad dressings    For snacks and desserts  · Yogurt  · Unsalted, air-popped popcorn  · Unsalted nuts or seeds  Stay away from  · Pies and cakes  · Packaged dessert mixes  · Pizza  · Canned and packaged tested more often if you are at risk for heart disease or stroke. Work with your healthcare provider to find out your risk for cardiovascular disease and stroke. Why do I need this test?  You may have this test as part of your regular health checkup.  You diabetes, lack of exercise, high blood pressure, unhealthy diet, and age. To help find your overall risk, your provider may use a risk calculator. It takes into account your cholesterol level and other risk factors.  Ask your healthcare provider about you Tell your healthcare provider about all medicines, herbs, vitamins, and supplements you take. This includes medicines that don't need a prescription and any illegal drugs you may use.    Paula last reviewed this educational content on 9/1/2020  © 2000- herbs.  · Take your medicine exactly as directed. This helps make sure that it works as it should. · Don't skip a dose. · Don't stop taking it if you feel better. · Don't stop taking it when your cholesterol numbers improve.   · Order your refill before

## 2021-10-01 DIAGNOSIS — I10 ESSENTIAL HYPERTENSION: ICD-10-CM

## 2021-10-01 DIAGNOSIS — E78.00 HYPERCHOLESTEROLEMIA: ICD-10-CM

## 2021-10-02 RX ORDER — LISINOPRIL AND HYDROCHLOROTHIAZIDE 25; 20 MG/1; MG/1
TABLET ORAL
Qty: 90 TABLET | Refills: 0 | Status: SHIPPED | OUTPATIENT
Start: 2021-10-02 | End: 2022-01-24

## 2021-10-02 RX ORDER — ATORVASTATIN CALCIUM 10 MG/1
TABLET, FILM COATED ORAL
Qty: 90 TABLET | Refills: 0 | Status: SHIPPED | OUTPATIENT
Start: 2021-10-02 | End: 2022-01-24

## 2022-01-24 DIAGNOSIS — I10 ESSENTIAL HYPERTENSION: ICD-10-CM

## 2022-01-24 DIAGNOSIS — E78.00 HYPERCHOLESTEROLEMIA: ICD-10-CM

## 2022-01-24 RX ORDER — LISINOPRIL AND HYDROCHLOROTHIAZIDE 25; 20 MG/1; MG/1
TABLET ORAL
Qty: 90 TABLET | Refills: 0 | Status: SHIPPED | OUTPATIENT
Start: 2022-01-24

## 2022-01-24 RX ORDER — ATORVASTATIN CALCIUM 10 MG/1
TABLET, FILM COATED ORAL
Qty: 90 TABLET | Refills: 0 | Status: SHIPPED | OUTPATIENT
Start: 2022-01-24

## 2022-02-21 ENCOUNTER — OFFICE VISIT (OUTPATIENT)
Dept: FAMILY MEDICINE CLINIC | Facility: CLINIC | Age: 72
End: 2022-02-21
Payer: MEDICARE

## 2022-02-21 VITALS
HEIGHT: 62 IN | DIASTOLIC BLOOD PRESSURE: 68 MMHG | SYSTOLIC BLOOD PRESSURE: 122 MMHG | OXYGEN SATURATION: 98 % | HEART RATE: 72 BPM | RESPIRATION RATE: 15 BRPM | BODY MASS INDEX: 28.16 KG/M2 | WEIGHT: 153 LBS

## 2022-02-21 DIAGNOSIS — N52.9 ERECTILE DYSFUNCTION, UNSPECIFIED ERECTILE DYSFUNCTION TYPE: ICD-10-CM

## 2022-02-21 DIAGNOSIS — Z00.00 LABORATORY EXAMINATION ORDERED AS PART OF A ROUTINE GENERAL MEDICAL EXAMINATION: ICD-10-CM

## 2022-02-21 DIAGNOSIS — H61.22 IMPACTED CERUMEN, LEFT EAR: ICD-10-CM

## 2022-02-21 DIAGNOSIS — Z00.00 ENCOUNTER FOR ANNUAL HEALTH EXAMINATION: Primary | ICD-10-CM

## 2022-02-21 DIAGNOSIS — Z12.5 SCREENING FOR PROSTATE CANCER: ICD-10-CM

## 2022-02-21 DIAGNOSIS — N18.30 STAGE 3 CHRONIC KIDNEY DISEASE, UNSPECIFIED WHETHER STAGE 3A OR 3B CKD (HCC): ICD-10-CM

## 2022-02-21 DIAGNOSIS — E78.00 HYPERCHOLESTEROLEMIA: ICD-10-CM

## 2022-02-21 DIAGNOSIS — I10 ESSENTIAL HYPERTENSION: ICD-10-CM

## 2022-02-21 PROCEDURE — 3074F SYST BP LT 130 MM HG: CPT | Performed by: EMERGENCY MEDICINE

## 2022-02-21 PROCEDURE — 3008F BODY MASS INDEX DOCD: CPT | Performed by: EMERGENCY MEDICINE

## 2022-02-21 PROCEDURE — G0439 PPPS, SUBSEQ VISIT: HCPCS | Performed by: EMERGENCY MEDICINE

## 2022-02-21 PROCEDURE — 96160 PT-FOCUSED HLTH RISK ASSMT: CPT | Performed by: EMERGENCY MEDICINE

## 2022-02-21 PROCEDURE — 99397 PER PM REEVAL EST PAT 65+ YR: CPT | Performed by: EMERGENCY MEDICINE

## 2022-02-21 PROCEDURE — 3078F DIAST BP <80 MM HG: CPT | Performed by: EMERGENCY MEDICINE

## 2022-02-22 ENCOUNTER — LAB ENCOUNTER (OUTPATIENT)
Dept: LAB | Age: 72
End: 2022-02-22
Attending: EMERGENCY MEDICINE
Payer: MEDICARE

## 2022-02-22 DIAGNOSIS — N52.9 ERECTILE DYSFUNCTION, UNSPECIFIED ERECTILE DYSFUNCTION TYPE: ICD-10-CM

## 2022-02-22 DIAGNOSIS — N18.30 STAGE 3 CHRONIC KIDNEY DISEASE, UNSPECIFIED WHETHER STAGE 3A OR 3B CKD (HCC): ICD-10-CM

## 2022-02-22 DIAGNOSIS — I10 ESSENTIAL HYPERTENSION: ICD-10-CM

## 2022-02-22 DIAGNOSIS — Z00.00 LABORATORY EXAMINATION ORDERED AS PART OF A ROUTINE GENERAL MEDICAL EXAMINATION: ICD-10-CM

## 2022-02-22 DIAGNOSIS — Z12.5 SCREENING FOR PROSTATE CANCER: ICD-10-CM

## 2022-02-22 DIAGNOSIS — E78.00 HYPERCHOLESTEROLEMIA: ICD-10-CM

## 2022-02-22 LAB
ALBUMIN SERPL-MCNC: 3.7 G/DL (ref 3.4–5)
ALBUMIN/GLOB SERPL: 1 {RATIO} (ref 1–2)
ALP LIVER SERPL-CCNC: 63 U/L
ALT SERPL-CCNC: 16 U/L
ANION GAP SERPL CALC-SCNC: 4 MMOL/L (ref 0–18)
AST SERPL-CCNC: 15 U/L (ref 15–37)
BASOPHILS # BLD AUTO: 0.02 X10(3) UL (ref 0–0.2)
BASOPHILS NFR BLD AUTO: 0.3 %
BILIRUB SERPL-MCNC: 0.4 MG/DL (ref 0.1–2)
BUN BLD-MCNC: 23 MG/DL (ref 7–18)
CALCIUM BLD-MCNC: 8.8 MG/DL (ref 8.5–10.1)
CHLORIDE SERPL-SCNC: 110 MMOL/L (ref 98–112)
CHOLEST SERPL-MCNC: 167 MG/DL (ref ?–200)
CO2 SERPL-SCNC: 26 MMOL/L (ref 21–32)
COMPLEXED PSA SERPL-MCNC: 1.56 NG/ML (ref ?–4)
CREAT BLD-MCNC: 1.42 MG/DL
EOSINOPHIL # BLD AUTO: 0.12 X10(3) UL (ref 0–0.7)
EOSINOPHIL NFR BLD AUTO: 2.1 %
ERYTHROCYTE [DISTWIDTH] IN BLOOD BY AUTOMATED COUNT: 14.2 %
FASTING PATIENT LIPID ANSWER: YES
FASTING STATUS PATIENT QL REPORTED: YES
GLOBULIN PLAS-MCNC: 3.7 G/DL (ref 2.8–4.4)
GLUCOSE BLD-MCNC: 95 MG/DL (ref 70–99)
HCT VFR BLD AUTO: 39.1 %
HDLC SERPL-MCNC: 54 MG/DL (ref 40–59)
HGB BLD-MCNC: 12.3 G/DL
IMM GRANULOCYTES # BLD AUTO: 0.01 X10(3) UL (ref 0–1)
IMM GRANULOCYTES NFR BLD: 0.2 %
LDLC SERPL CALC-MCNC: 93 MG/DL (ref ?–100)
LYMPHOCYTES # BLD AUTO: 1.31 X10(3) UL (ref 1–4)
LYMPHOCYTES NFR BLD AUTO: 22.5 %
MCH RBC QN AUTO: 28.9 PG (ref 26–34)
MCHC RBC AUTO-ENTMCNC: 31.5 G/DL (ref 31–37)
MCV RBC AUTO: 92 FL
MONOCYTES # BLD AUTO: 0.53 X10(3) UL (ref 0.1–1)
MONOCYTES NFR BLD AUTO: 9.1 %
NEUTROPHILS # BLD AUTO: 3.82 X10 (3) UL (ref 1.5–7.7)
NEUTROPHILS # BLD AUTO: 3.82 X10(3) UL (ref 1.5–7.7)
NEUTROPHILS NFR BLD AUTO: 65.8 %
NONHDLC SERPL-MCNC: 113 MG/DL (ref ?–130)
OSMOLALITY SERPL CALC.SUM OF ELEC: 293 MOSM/KG (ref 275–295)
PLATELET # BLD AUTO: 169 10(3)UL (ref 150–450)
POTASSIUM SERPL-SCNC: 4.6 MMOL/L (ref 3.5–5.1)
PROT SERPL-MCNC: 7.4 G/DL (ref 6.4–8.2)
RBC # BLD AUTO: 4.25 X10(6)UL
SODIUM SERPL-SCNC: 140 MMOL/L (ref 136–145)
TRIGL SERPL-MCNC: 112 MG/DL (ref 30–149)
TSI SER-ACNC: 1.39 MIU/ML (ref 0.36–3.74)
VLDLC SERPL CALC-MCNC: 18 MG/DL (ref 0–30)
WBC # BLD AUTO: 5.8 X10(3) UL (ref 4–11)

## 2022-02-22 PROCEDURE — 80061 LIPID PANEL: CPT

## 2022-02-22 PROCEDURE — 80053 COMPREHEN METABOLIC PANEL: CPT

## 2022-02-22 PROCEDURE — 36415 COLL VENOUS BLD VENIPUNCTURE: CPT

## 2022-02-22 PROCEDURE — 85025 COMPLETE CBC W/AUTO DIFF WBC: CPT

## 2022-02-22 PROCEDURE — 84443 ASSAY THYROID STIM HORMONE: CPT

## 2022-03-08 ENCOUNTER — TELEPHONE (OUTPATIENT)
Dept: FAMILY MEDICINE CLINIC | Facility: CLINIC | Age: 72
End: 2022-03-08

## 2022-03-08 NOTE — TELEPHONE ENCOUNTER
----- Message from Xu Cheek MD sent at 3/7/2022 10:08 PM CST -----  Creatinine and GFR mildly depressed  Pls have patient push fluids,  Repeat BMP in 2-3 weeks  Rest of labs stable continue with statin therapy      lmom for pt to please call office to discuss above

## 2022-03-14 NOTE — TELEPHONE ENCOUNTER
Spoke with patient, went tover lab results as he asked questions. Instructed him doing BMP in 2-3 weeks, and to make sure he is well hydrated before the test. Verbalized understanding.

## 2022-04-08 RX ORDER — SILDENAFIL 50 MG/1
TABLET, FILM COATED ORAL
Qty: 18 TABLET | Refills: 0 | Status: SHIPPED | OUTPATIENT
Start: 2022-04-08

## 2022-04-22 RX ORDER — SILDENAFIL 50 MG/1
TABLET, FILM COATED ORAL
Qty: 18 TABLET | Refills: 0 | Status: SHIPPED | OUTPATIENT
Start: 2022-04-22

## 2022-04-22 RX ORDER — ATORVASTATIN CALCIUM 10 MG/1
TABLET, FILM COATED ORAL
Qty: 90 TABLET | Refills: 0 | Status: SHIPPED | OUTPATIENT
Start: 2022-04-22

## 2022-04-22 RX ORDER — LISINOPRIL AND HYDROCHLOROTHIAZIDE 25; 20 MG/1; MG/1
TABLET ORAL
Qty: 90 TABLET | Refills: 0 | Status: SHIPPED | OUTPATIENT
Start: 2022-04-22

## 2022-08-08 ENCOUNTER — LAB ENCOUNTER (OUTPATIENT)
Dept: LAB | Age: 72
End: 2022-08-08
Attending: EMERGENCY MEDICINE
Payer: MEDICARE

## 2022-08-08 ENCOUNTER — OFFICE VISIT (OUTPATIENT)
Dept: FAMILY MEDICINE CLINIC | Facility: CLINIC | Age: 72
End: 2022-08-08
Payer: MEDICARE

## 2022-08-08 VITALS
SYSTOLIC BLOOD PRESSURE: 118 MMHG | RESPIRATION RATE: 16 BRPM | HEIGHT: 62 IN | OXYGEN SATURATION: 98 % | HEART RATE: 70 BPM | DIASTOLIC BLOOD PRESSURE: 70 MMHG | BODY MASS INDEX: 26.73 KG/M2 | WEIGHT: 145.25 LBS

## 2022-08-08 DIAGNOSIS — N52.9 ERECTILE DYSFUNCTION, UNSPECIFIED ERECTILE DYSFUNCTION TYPE: ICD-10-CM

## 2022-08-08 DIAGNOSIS — E78.00 HYPERCHOLESTEROLEMIA: ICD-10-CM

## 2022-08-08 DIAGNOSIS — I10 ESSENTIAL HYPERTENSION: ICD-10-CM

## 2022-08-08 LAB
ALBUMIN SERPL-MCNC: 4 G/DL (ref 3.4–5)
ALBUMIN/GLOB SERPL: 0.9 {RATIO} (ref 1–2)
ALP LIVER SERPL-CCNC: 79 U/L
ALT SERPL-CCNC: 33 U/L
ANION GAP SERPL CALC-SCNC: 7 MMOL/L (ref 0–18)
AST SERPL-CCNC: 26 U/L (ref 15–37)
BILIRUB SERPL-MCNC: 0.7 MG/DL (ref 0.1–2)
BUN BLD-MCNC: 29 MG/DL (ref 7–18)
CALCIUM BLD-MCNC: 9.5 MG/DL (ref 8.5–10.1)
CHLORIDE SERPL-SCNC: 99 MMOL/L (ref 98–112)
CHOLEST SERPL-MCNC: 185 MG/DL (ref ?–200)
CO2 SERPL-SCNC: 28 MMOL/L (ref 21–32)
CREAT BLD-MCNC: 1.68 MG/DL
FASTING PATIENT LIPID ANSWER: YES
FASTING STATUS PATIENT QL REPORTED: YES
GFR SERPLBLD BASED ON 1.73 SQ M-ARVRAT: 42 ML/MIN/1.73M2 (ref 60–?)
GLOBULIN PLAS-MCNC: 4.4 G/DL (ref 2.8–4.4)
GLUCOSE BLD-MCNC: 89 MG/DL (ref 70–99)
HDLC SERPL-MCNC: 54 MG/DL (ref 40–59)
LDLC SERPL CALC-MCNC: 105 MG/DL (ref ?–100)
NONHDLC SERPL-MCNC: 131 MG/DL (ref ?–130)
OSMOLALITY SERPL CALC.SUM OF ELEC: 283 MOSM/KG (ref 275–295)
POTASSIUM SERPL-SCNC: 4.8 MMOL/L (ref 3.5–5.1)
PROT SERPL-MCNC: 8.4 G/DL (ref 6.4–8.2)
SODIUM SERPL-SCNC: 134 MMOL/L (ref 136–145)
TRIGL SERPL-MCNC: 150 MG/DL (ref 30–149)
VLDLC SERPL CALC-MCNC: 25 MG/DL (ref 0–30)

## 2022-08-08 PROCEDURE — 99214 OFFICE O/P EST MOD 30 MIN: CPT | Performed by: EMERGENCY MEDICINE

## 2022-08-08 PROCEDURE — 1126F AMNT PAIN NOTED NONE PRSNT: CPT | Performed by: EMERGENCY MEDICINE

## 2022-08-08 PROCEDURE — 3078F DIAST BP <80 MM HG: CPT | Performed by: EMERGENCY MEDICINE

## 2022-08-08 PROCEDURE — 3074F SYST BP LT 130 MM HG: CPT | Performed by: EMERGENCY MEDICINE

## 2022-08-08 PROCEDURE — 36415 COLL VENOUS BLD VENIPUNCTURE: CPT

## 2022-08-08 PROCEDURE — 3008F BODY MASS INDEX DOCD: CPT | Performed by: EMERGENCY MEDICINE

## 2022-08-08 PROCEDURE — 80061 LIPID PANEL: CPT

## 2022-08-08 PROCEDURE — 80053 COMPREHEN METABOLIC PANEL: CPT

## 2022-08-08 RX ORDER — ATORVASTATIN CALCIUM 10 MG/1
10 TABLET, FILM COATED ORAL DAILY
Qty: 90 TABLET | Refills: 1 | Status: SHIPPED | OUTPATIENT
Start: 2022-08-08

## 2022-08-08 RX ORDER — LISINOPRIL AND HYDROCHLOROTHIAZIDE 25; 20 MG/1; MG/1
1 TABLET ORAL DAILY
Qty: 90 TABLET | Refills: 1 | Status: SHIPPED | OUTPATIENT
Start: 2022-08-08

## 2022-08-08 RX ORDER — SILDENAFIL 50 MG/1
TABLET, FILM COATED ORAL DAILY PRN
Qty: 18 TABLET | Refills: 1 | Status: SHIPPED | OUTPATIENT
Start: 2022-08-08

## 2022-08-10 ENCOUNTER — TELEPHONE (OUTPATIENT)
Dept: FAMILY MEDICINE CLINIC | Facility: CLINIC | Age: 72
End: 2022-08-10

## 2022-08-10 DIAGNOSIS — N18.30 STAGE 3 CHRONIC KIDNEY DISEASE, UNSPECIFIED WHETHER STAGE 3A OR 3B CKD (HCC): ICD-10-CM

## 2022-08-10 DIAGNOSIS — E78.00 HYPERCHOLESTEROLEMIA: Primary | ICD-10-CM

## 2022-08-11 NOTE — TELEPHONE ENCOUNTER
----- Message from Darline Santillan MD sent at 8/10/2022 12:16 PM CDT -----  Creatinine and BUN sligtly elevated  Pls have patient push fluids, hydrate  Repeat CMP in 1 month with lipids      Lipids not at goal, pls have patient increase atorvastatin to 20 mg  Repeat CMP and lipids in 1 month    Left msg for pt asking him to call office to discuss results

## 2022-08-16 RX ORDER — ATORVASTATIN CALCIUM 20 MG/1
20 TABLET, FILM COATED ORAL NIGHTLY
Qty: 90 TABLET | Refills: 1 | Status: SHIPPED | OUTPATIENT
Start: 2022-08-16

## 2023-02-24 ENCOUNTER — TELEPHONE (OUTPATIENT)
Dept: FAMILY MEDICINE CLINIC | Facility: CLINIC | Age: 73
End: 2023-02-24

## 2023-02-28 ENCOUNTER — LAB ENCOUNTER (OUTPATIENT)
Dept: LAB | Age: 73
End: 2023-02-28
Attending: EMERGENCY MEDICINE
Payer: MEDICARE

## 2023-02-28 ENCOUNTER — OFFICE VISIT (OUTPATIENT)
Dept: FAMILY MEDICINE CLINIC | Facility: CLINIC | Age: 73
End: 2023-02-28
Payer: MEDICARE

## 2023-02-28 VITALS
HEART RATE: 80 BPM | WEIGHT: 154 LBS | SYSTOLIC BLOOD PRESSURE: 116 MMHG | OXYGEN SATURATION: 98 % | RESPIRATION RATE: 21 BRPM | DIASTOLIC BLOOD PRESSURE: 70 MMHG | BODY MASS INDEX: 28.34 KG/M2 | HEIGHT: 62 IN

## 2023-02-28 DIAGNOSIS — E78.00 HYPERCHOLESTEROLEMIA: ICD-10-CM

## 2023-02-28 DIAGNOSIS — N52.9 ERECTILE DYSFUNCTION, UNSPECIFIED ERECTILE DYSFUNCTION TYPE: ICD-10-CM

## 2023-02-28 DIAGNOSIS — Z00.00 ENCOUNTER FOR ANNUAL HEALTH EXAMINATION: Primary | ICD-10-CM

## 2023-02-28 DIAGNOSIS — I10 ESSENTIAL HYPERTENSION: ICD-10-CM

## 2023-02-28 DIAGNOSIS — Z12.5 PROSTATE CANCER SCREENING: ICD-10-CM

## 2023-02-28 DIAGNOSIS — B35.1 ONYCHOMYCOSIS OF TOENAIL: ICD-10-CM

## 2023-02-28 DIAGNOSIS — Z00.00 ENCOUNTER FOR ANNUAL HEALTH EXAMINATION: ICD-10-CM

## 2023-02-28 DIAGNOSIS — Z79.899 HIGH RISK MEDICATION USE: ICD-10-CM

## 2023-02-28 DIAGNOSIS — L30.9 ECZEMA, UNSPECIFIED TYPE: ICD-10-CM

## 2023-02-28 DIAGNOSIS — N18.30 STAGE 3 CHRONIC KIDNEY DISEASE, UNSPECIFIED WHETHER STAGE 3A OR 3B CKD (HCC): ICD-10-CM

## 2023-02-28 LAB
ALBUMIN SERPL-MCNC: 3.9 G/DL (ref 3.4–5)
ALBUMIN/GLOB SERPL: 1 {RATIO} (ref 1–2)
ALP LIVER SERPL-CCNC: 77 U/L
ALT SERPL-CCNC: 23 U/L
ANION GAP SERPL CALC-SCNC: 8 MMOL/L (ref 0–18)
AST SERPL-CCNC: 21 U/L (ref 15–37)
BASOPHILS # BLD AUTO: 0.02 X10(3) UL (ref 0–0.2)
BASOPHILS NFR BLD AUTO: 0.3 %
BILIRUB SERPL-MCNC: 0.8 MG/DL (ref 0.1–2)
BUN BLD-MCNC: 24 MG/DL (ref 7–18)
CALCIUM BLD-MCNC: 8.9 MG/DL (ref 8.5–10.1)
CHLORIDE SERPL-SCNC: 102 MMOL/L (ref 98–112)
CHOLEST SERPL-MCNC: 179 MG/DL (ref ?–200)
CO2 SERPL-SCNC: 26 MMOL/L (ref 21–32)
COMPLEXED PSA SERPL-MCNC: 1.68 NG/ML (ref ?–4)
CREAT BLD-MCNC: 1.5 MG/DL
EOSINOPHIL # BLD AUTO: 0.08 X10(3) UL (ref 0–0.7)
EOSINOPHIL NFR BLD AUTO: 1.2 %
ERYTHROCYTE [DISTWIDTH] IN BLOOD BY AUTOMATED COUNT: 13.8 %
FASTING PATIENT LIPID ANSWER: YES
FASTING STATUS PATIENT QL REPORTED: YES
GFR SERPLBLD BASED ON 1.73 SQ M-ARVRAT: 49 ML/MIN/1.73M2 (ref 60–?)
GLOBULIN PLAS-MCNC: 4 G/DL (ref 2.8–4.4)
GLUCOSE BLD-MCNC: 108 MG/DL (ref 70–99)
HCT VFR BLD AUTO: 41.3 %
HDLC SERPL-MCNC: 45 MG/DL (ref 40–59)
HGB BLD-MCNC: 13.9 G/DL
IMM GRANULOCYTES # BLD AUTO: 0.01 X10(3) UL (ref 0–1)
IMM GRANULOCYTES NFR BLD: 0.2 %
LDLC SERPL CALC-MCNC: 109 MG/DL (ref ?–100)
LYMPHOCYTES # BLD AUTO: 1.11 X10(3) UL (ref 1–4)
LYMPHOCYTES NFR BLD AUTO: 17.2 %
MCH RBC QN AUTO: 29.6 PG (ref 26–34)
MCHC RBC AUTO-ENTMCNC: 33.7 G/DL (ref 31–37)
MCV RBC AUTO: 88.1 FL
MONOCYTES # BLD AUTO: 0.6 X10(3) UL (ref 0.1–1)
MONOCYTES NFR BLD AUTO: 9.3 %
NEUTROPHILS # BLD AUTO: 4.65 X10 (3) UL (ref 1.5–7.7)
NEUTROPHILS # BLD AUTO: 4.65 X10(3) UL (ref 1.5–7.7)
NEUTROPHILS NFR BLD AUTO: 71.8 %
NONHDLC SERPL-MCNC: 134 MG/DL (ref ?–130)
OSMOLALITY SERPL CALC.SUM OF ELEC: 287 MOSM/KG (ref 275–295)
PLATELET # BLD AUTO: 181 10(3)UL (ref 150–450)
POTASSIUM SERPL-SCNC: 4.3 MMOL/L (ref 3.5–5.1)
PROT SERPL-MCNC: 7.9 G/DL (ref 6.4–8.2)
RBC # BLD AUTO: 4.69 X10(6)UL
SODIUM SERPL-SCNC: 136 MMOL/L (ref 136–145)
TRIGL SERPL-MCNC: 142 MG/DL (ref 30–149)
TSI SER-ACNC: 1.58 MIU/ML (ref 0.36–3.74)
VLDLC SERPL CALC-MCNC: 24 MG/DL (ref 0–30)
WBC # BLD AUTO: 6.5 X10(3) UL (ref 4–11)

## 2023-02-28 PROCEDURE — 99397 PER PM REEVAL EST PAT 65+ YR: CPT | Performed by: EMERGENCY MEDICINE

## 2023-02-28 PROCEDURE — G0439 PPPS, SUBSEQ VISIT: HCPCS | Performed by: EMERGENCY MEDICINE

## 2023-02-28 PROCEDURE — 3008F BODY MASS INDEX DOCD: CPT | Performed by: EMERGENCY MEDICINE

## 2023-02-28 PROCEDURE — 80053 COMPREHEN METABOLIC PANEL: CPT

## 2023-02-28 PROCEDURE — 85025 COMPLETE CBC W/AUTO DIFF WBC: CPT

## 2023-02-28 PROCEDURE — 36415 COLL VENOUS BLD VENIPUNCTURE: CPT

## 2023-02-28 PROCEDURE — 80061 LIPID PANEL: CPT

## 2023-02-28 PROCEDURE — 96160 PT-FOCUSED HLTH RISK ASSMT: CPT | Performed by: EMERGENCY MEDICINE

## 2023-02-28 PROCEDURE — 3074F SYST BP LT 130 MM HG: CPT | Performed by: EMERGENCY MEDICINE

## 2023-02-28 PROCEDURE — 3078F DIAST BP <80 MM HG: CPT | Performed by: EMERGENCY MEDICINE

## 2023-02-28 PROCEDURE — 1126F AMNT PAIN NOTED NONE PRSNT: CPT | Performed by: EMERGENCY MEDICINE

## 2023-02-28 PROCEDURE — 84443 ASSAY THYROID STIM HORMONE: CPT

## 2023-02-28 RX ORDER — TERBINAFINE HYDROCHLORIDE 250 MG/1
250 TABLET ORAL DAILY
Qty: 30 TABLET | Refills: 0 | Status: SHIPPED | OUTPATIENT
Start: 2023-02-28

## 2023-02-28 RX ORDER — TRIAMCINOLONE ACETONIDE 5 MG/G
1 CREAM TOPICAL 2 TIMES DAILY
Qty: 30 G | Refills: 1 | Status: SHIPPED | OUTPATIENT
Start: 2023-02-28

## 2023-02-28 NOTE — PATIENT INSTRUCTIONS
Thank you for choosing [facility]  To Do:  FOR MARCIN MORALES    BP goal is <140/90  Continue with BP medications and atorvastatin  Have blood tests done  Regular BP checks 2-3 x a week  Arrange for HEART SCAN  Follow up in 6 months  Low salt diet  Avoid harsh soaps avoid hot showers  Use CEREVAE CREAM to keep skin hydrated  Use steroid cream as needed for rash              Blanca Morales's SCREENING SCHEDULE   Tests on this list are recommended by your physician but may not be covered, or covered at this frequency, by your insurer. Please check with your insurance carrier before scheduling to verify coverage.    PREVENTATIVE SERVICES FREQUENCY &  COVERAGE DETAILS LAST COMPLETION DATE   Diabetes Screening    Fasting Blood Sugar / Glucose    One screening every 12 months if never tested or if previously tested but not diagnosed with pre-diabetes   One screening every 6 months if diagnosed with pre-diabetes Lab Results   Component Value Date    GLU 89 08/08/2022        Cardiovascular Disease Screening    Lipid Panel  Cholesterol  Lipoprotein (HDL)  Triglycerides Covered every 5 years for all Medicare beneficiaries without apparent signs or symptoms of cardiovascular disease Lab Results   Component Value Date    CHOLEST 185 08/08/2022    HDL 54 08/08/2022     (H) 08/08/2022    TRIG 150 (H) 08/08/2022         Electrocardiogram (EKG)   Covered if needed at Welcome to Medicare, and non-screening if indicated for medical reasons -      Ultrasound Screening for Abdominal Aortic Aneurysm (AAA) Covered once in a lifetime for one of the following risk factors    Men who are 73-68 years old and have ever smoked    Anyone with a family history -     Colorectal Cancer Screening  Covered for ages 52-80; only need ONE of the following:    Colonoscopy   Covered every 10 years    Covered every 2 years if patient is at high risk or previous colonoscopy was abnormal 05/28/2019    Colorectal Cancer Screening due on 05/28/2024    Flexible Sigmoidoscopy   Covered every 4 years -    Fecal Occult Blood Test Covered annually -   Prostate Cancer Screening    Prostate-Specific Antigen (PSA) Annually No results found for: PSA  PSA due on 02/22/2024   Immunizations    Influenza Covered once per flu season  Please get every year 11/14/2022  No recommendations at this time    Pneumococcal Each vaccine (Llbcoyz65 & Rchxroglw30) covered once after 65 Prevnar 13: 04/25/2019    Enjpbyokn33: 07/16/2020     No recommendations at this time    Hepatitis B One screening covered for patients with certain risk factors   -  No recommendations at this time    Tetanus Toxoid Not covered by Medicare Part B unless medically necessary (cut with metal); may be covered with your pharmacy prescription benefits -    Tetanus, Diptheria and Pertusis TD and TDaP Not covered by Medicare Part B -  No recommendations at this time    Zoster Not covered by Medicare Part B; may be covered with your pharmacy  prescription benefits -  Zoster Vaccines(2 of 2) due on 01/09/2023       Annual Monitoring of Persistent Medications (ACE/ARB, digoxin diuretics, anticonvulsants)    Potassium Annually Lab Results   Component Value Date    K 4.8 08/08/2022         Creatinine   Annually Lab Results   Component Value Date    CREATSERUM 1.68 (H) 08/08/2022         BUN Annually Lab Results   Component Value Date    BUN 29 (H) 08/08/2022       Drug Serum Conc Annually No results found for: DIGOXIN, DIG, VALP

## 2023-03-03 ENCOUNTER — TELEPHONE (OUTPATIENT)
Dept: FAMILY MEDICINE CLINIC | Facility: CLINIC | Age: 73
End: 2023-03-03

## 2023-03-03 NOTE — TELEPHONE ENCOUNTER
Notified the patient of the below lab results and recommendations. Patient verbalized understanding. Answered all questions at this time.

## 2023-03-03 NOTE — TELEPHONE ENCOUNTER
----- Message from Ingrid Coronado MD sent at 3/2/2023  8:59 AM CST -----  All labs stable  Continue with all meds

## 2023-05-22 DIAGNOSIS — I10 ESSENTIAL HYPERTENSION: ICD-10-CM

## 2023-05-22 RX ORDER — LISINOPRIL AND HYDROCHLOROTHIAZIDE 25; 20 MG/1; MG/1
TABLET ORAL
Qty: 90 TABLET | Refills: 1 | Status: SHIPPED | OUTPATIENT
Start: 2023-05-22

## 2023-05-22 RX ORDER — ATORVASTATIN CALCIUM 20 MG/1
TABLET, FILM COATED ORAL
Qty: 90 TABLET | Refills: 1 | Status: SHIPPED | OUTPATIENT
Start: 2023-05-22

## 2023-05-30 ENCOUNTER — LAB ENCOUNTER (OUTPATIENT)
Dept: LAB | Age: 73
End: 2023-05-30
Attending: EMERGENCY MEDICINE
Payer: MEDICARE

## 2023-05-30 DIAGNOSIS — Z79.899 HIGH RISK MEDICATION USE: ICD-10-CM

## 2023-05-30 DIAGNOSIS — N18.30 STAGE 3 CHRONIC KIDNEY DISEASE, UNSPECIFIED WHETHER STAGE 3A OR 3B CKD (HCC): ICD-10-CM

## 2023-05-30 DIAGNOSIS — B35.1 ONYCHOMYCOSIS OF TOENAIL: ICD-10-CM

## 2023-05-30 DIAGNOSIS — E78.00 HYPERCHOLESTEROLEMIA: ICD-10-CM

## 2023-05-30 LAB
ALBUMIN SERPL-MCNC: 4.1 G/DL (ref 3.4–5)
ALBUMIN/GLOB SERPL: 1 {RATIO} (ref 1–2)
ALP LIVER SERPL-CCNC: 68 U/L
ALT SERPL-CCNC: 23 U/L
ANION GAP SERPL CALC-SCNC: 6 MMOL/L (ref 0–18)
AST SERPL-CCNC: 21 U/L (ref 15–37)
BILIRUB DIRECT SERPL-MCNC: 0.2 MG/DL (ref 0–0.2)
BILIRUB SERPL-MCNC: 0.8 MG/DL (ref 0.1–2)
BUN BLD-MCNC: 31 MG/DL (ref 7–18)
CALCIUM BLD-MCNC: 8.9 MG/DL (ref 8.5–10.1)
CHLORIDE SERPL-SCNC: 102 MMOL/L (ref 98–112)
CHOLEST SERPL-MCNC: 158 MG/DL (ref ?–200)
CO2 SERPL-SCNC: 25 MMOL/L (ref 21–32)
CREAT BLD-MCNC: 2.06 MG/DL
FASTING PATIENT LIPID ANSWER: YES
FASTING STATUS PATIENT QL REPORTED: YES
GFR SERPLBLD BASED ON 1.73 SQ M-ARVRAT: 33 ML/MIN/1.73M2 (ref 60–?)
GLOBULIN PLAS-MCNC: 4.1 G/DL (ref 2.8–4.4)
GLUCOSE BLD-MCNC: 95 MG/DL (ref 70–99)
HDLC SERPL-MCNC: 50 MG/DL (ref 40–59)
LDLC SERPL CALC-MCNC: 90 MG/DL (ref ?–100)
NONHDLC SERPL-MCNC: 108 MG/DL (ref ?–130)
OSMOLALITY SERPL CALC.SUM OF ELEC: 282 MOSM/KG (ref 275–295)
POTASSIUM SERPL-SCNC: 4.3 MMOL/L (ref 3.5–5.1)
PROT SERPL-MCNC: 8.2 G/DL (ref 6.4–8.2)
SODIUM SERPL-SCNC: 133 MMOL/L (ref 136–145)
TRIGL SERPL-MCNC: 100 MG/DL (ref 30–149)
VLDLC SERPL CALC-MCNC: 16 MG/DL (ref 0–30)

## 2023-05-30 PROCEDURE — 80053 COMPREHEN METABOLIC PANEL: CPT

## 2023-05-30 PROCEDURE — 82248 BILIRUBIN DIRECT: CPT

## 2023-05-30 PROCEDURE — 36415 COLL VENOUS BLD VENIPUNCTURE: CPT

## 2023-05-30 PROCEDURE — 80061 LIPID PANEL: CPT

## 2023-05-31 ENCOUNTER — TELEPHONE (OUTPATIENT)
Dept: FAMILY MEDICINE CLINIC | Facility: CLINIC | Age: 73
End: 2023-05-31

## 2023-05-31 DIAGNOSIS — N18.30 STAGE 3 CHRONIC KIDNEY DISEASE, UNSPECIFIED WHETHER STAGE 3A OR 3B CKD (HCC): Primary | ICD-10-CM

## 2023-07-14 ENCOUNTER — HOSPITAL ENCOUNTER (OUTPATIENT)
Dept: CT IMAGING | Age: 73
Discharge: HOME | End: 2023-07-14
Attending: EMERGENCY MEDICINE

## 2023-07-14 DIAGNOSIS — E78.00 HYPERCHOLESTEROLEMIA: ICD-10-CM

## 2023-07-14 DIAGNOSIS — I10 ESSENTIAL HYPERTENSION: ICD-10-CM

## 2023-07-14 NOTE — PROGRESS NOTES
Date of Service 7/14/2023    Judyth Kocher  Date of Birth 6/2/1950    Patient Age: 74year old    PCP: River Hamilton MD  [address]  [place] [address]    Heart Scan Consult  Preliminary Heart Scan Score: 514.75    Previous Screening  Heart Scan Completed Previously: No                   Risk Factors  Personal Risk Factors  Non-alterable Risk Factors: Personal History; Family History;Gender  Alterable Risk Factors: High Blood Pressure; Abnormal Cholesterol          Blood Pressure  Blood Pressure measurement declined during this encounter. (Normal =< 120/80,  Elevated = 120-129/ >80,  High Stage1 130-139/80-89 , Stage2 >140/>90)    Lipid Profile  Cholesterol: 158, done on 5/30/2023. HDL Cholesterol: 50, done on 5/30/2023. LDL Cholesterol: 90, done on 5/30/2023. TriGlycerides 100, done on 5/30/2023. Cholesterol Goals  Value   Total  =< 200   HDL  = > 45 Men = > 55 Women   LDL   =< 100   Triglycerides  =< 150       Glucose and Hemoglobin A1C  Lab Results   Component Value Date    GLU 95 05/30/2023     (Normal Fasting Glucose < 100mg/dl )    Nurse Review  Risk factor information and results reviewed with Nurse: Yes    Recommended Follow Up:  Consult your physician regarding[de-identified]   Final Heart Scan Report; Discuss potential for Incidental Finding      Recommendations for Change:  Nutrition Changes: Low Saturated Fat;Low Fat Dairy; Low Salt Eating; Increase Fiber    Cholesterol Modification (goal of therapy depends upon your risk): Increase HDL (Healthy/Good) Normal >45 Men >55 Women;  Decrease LDL (Lousy/Bad) Ideal <100;  Decrease Triglycerides (Ugly) Normal <150;Decrease Total Normal <200    Exercise: Enhance Current Program                   Repeat Heart Scan:   3 Years if Calcium Score is > 0.0; Discuss with your Physician         Other[de-identified] Good job improving your Cholesterol numbers Earla Client. ...keep up the good work!     Zahra Recommended Resources:  Recommended Resources: MICHELLE Screening  Recommended PV Screening: Carotids; Abdomen; Ankle-Brachial Index (ЕЛЕНА)   (PV Stroke Screening brochure provided.)    Other Resources[de-identified] Patient does not currently use MyChart; set-up pamphlet provided. Melony Anderson RN        Please Contact the Nurse Heart Line with any Questions or Concerns [phone].

## 2023-07-27 ENCOUNTER — TELEPHONE (OUTPATIENT)
Dept: FAMILY MEDICINE CLINIC | Facility: CLINIC | Age: 73
End: 2023-07-27

## 2023-07-27 DIAGNOSIS — E78.00 HYPERCHOLESTEROLEMIA: ICD-10-CM

## 2023-07-27 DIAGNOSIS — I10 ESSENTIAL HYPERTENSION: Primary | ICD-10-CM

## 2023-07-27 NOTE — TELEPHONE ENCOUNTER
----- Message from Artem Smith MD sent at 7/24/2023 12:14 PM CDT -----  Calcium score is high  Pls refer patient to cardiology, Dr Karis Buck

## 2023-07-27 NOTE — TELEPHONE ENCOUNTER
Called pt and was informed of result and recommendation from provider. All questions answered and pt verbalized understanding.        Marina Iglesias MD [address] [facility] [address]  [facility] [address] [phone]

## 2023-07-31 ENCOUNTER — OFFICE VISIT (OUTPATIENT)
Dept: NEPHROLOGY | Facility: CLINIC | Age: 73
End: 2023-07-31
Payer: MEDICARE

## 2023-07-31 VITALS — DIASTOLIC BLOOD PRESSURE: 70 MMHG | SYSTOLIC BLOOD PRESSURE: 128 MMHG | WEIGHT: 148.13 LBS | BODY MASS INDEX: 27 KG/M2

## 2023-07-31 DIAGNOSIS — N18.30 STAGE 3 CHRONIC KIDNEY DISEASE, UNSPECIFIED WHETHER STAGE 3A OR 3B CKD (HCC): Primary | ICD-10-CM

## 2023-07-31 DIAGNOSIS — N17.9 AKI (ACUTE KIDNEY INJURY) (HCC): ICD-10-CM

## 2023-07-31 DIAGNOSIS — I10 PRIMARY HYPERTENSION: ICD-10-CM

## 2023-07-31 PROCEDURE — 99204 OFFICE O/P NEW MOD 45 MIN: CPT | Performed by: INTERNAL MEDICINE

## 2023-08-14 ENCOUNTER — LAB ENCOUNTER (OUTPATIENT)
Dept: LAB | Age: 73
End: 2023-08-14
Attending: EMERGENCY MEDICINE
Payer: MEDICARE

## 2023-08-14 DIAGNOSIS — N18.30 STAGE 3 CHRONIC KIDNEY DISEASE, UNSPECIFIED WHETHER STAGE 3A OR 3B CKD (HCC): ICD-10-CM

## 2023-08-14 DIAGNOSIS — N17.9 AKI (ACUTE KIDNEY INJURY) (HCC): ICD-10-CM

## 2023-08-14 DIAGNOSIS — I10 PRIMARY HYPERTENSION: ICD-10-CM

## 2023-08-14 LAB
ANION GAP SERPL CALC-SCNC: 6 MMOL/L (ref 0–18)
BILIRUB UR QL STRIP.AUTO: NEGATIVE
BUN BLD-MCNC: 30 MG/DL (ref 7–18)
CALCIUM BLD-MCNC: 9.4 MG/DL (ref 8.5–10.1)
CHLORIDE SERPL-SCNC: 103 MMOL/L (ref 98–112)
CLARITY UR REFRACT.AUTO: CLEAR
CO2 SERPL-SCNC: 26 MMOL/L (ref 21–32)
CREAT BLD-MCNC: 1.83 MG/DL
EGFRCR SERPLBLD CKD-EPI 2021: 38 ML/MIN/1.73M2 (ref 60–?)
FASTING STATUS PATIENT QL REPORTED: YES
GLUCOSE BLD-MCNC: 109 MG/DL (ref 70–99)
GLUCOSE UR STRIP.AUTO-MCNC: NORMAL MG/DL
HYALINE CASTS #/AREA URNS AUTO: PRESENT /LPF
KETONES UR STRIP.AUTO-MCNC: NEGATIVE MG/DL
LEUKOCYTE ESTERASE UR QL STRIP.AUTO: NEGATIVE
NITRITE UR QL STRIP.AUTO: NEGATIVE
OSMOLALITY SERPL CALC.SUM OF ELEC: 287 MOSM/KG (ref 275–295)
PH UR STRIP.AUTO: 5.5 [PH] (ref 5–8)
POTASSIUM SERPL-SCNC: 4.3 MMOL/L (ref 3.5–5.1)
PROT UR STRIP.AUTO-MCNC: NEGATIVE MG/DL
RBC UR QL AUTO: NEGATIVE
SODIUM SERPL-SCNC: 135 MMOL/L (ref 136–145)
SP GR UR STRIP.AUTO: 1.01 (ref 1–1.03)
UROBILINOGEN UR STRIP.AUTO-MCNC: NORMAL MG/DL

## 2023-08-14 PROCEDURE — 36415 COLL VENOUS BLD VENIPUNCTURE: CPT

## 2023-08-14 PROCEDURE — 80048 BASIC METABOLIC PNL TOTAL CA: CPT

## 2023-08-14 PROCEDURE — 81003 URINALYSIS AUTO W/O SCOPE: CPT

## 2023-08-23 DIAGNOSIS — N52.9 ERECTILE DYSFUNCTION, UNSPECIFIED ERECTILE DYSFUNCTION TYPE: ICD-10-CM

## 2023-08-23 RX ORDER — SILDENAFIL 50 MG/1
TABLET, FILM COATED ORAL AS NEEDED
Qty: 18 TABLET | Refills: 1 | OUTPATIENT
Start: 2023-08-23

## 2023-08-29 ENCOUNTER — TELEPHONE (OUTPATIENT)
Dept: NEPHROLOGY | Facility: CLINIC | Age: 73
End: 2023-08-29

## 2023-08-31 ENCOUNTER — OFFICE VISIT (OUTPATIENT)
Dept: FAMILY MEDICINE CLINIC | Facility: CLINIC | Age: 73
End: 2023-08-31
Payer: MEDICARE

## 2023-08-31 VITALS
BODY MASS INDEX: 27.97 KG/M2 | WEIGHT: 152 LBS | HEART RATE: 85 BPM | DIASTOLIC BLOOD PRESSURE: 54 MMHG | HEIGHT: 62 IN | OXYGEN SATURATION: 97 % | SYSTOLIC BLOOD PRESSURE: 120 MMHG

## 2023-08-31 DIAGNOSIS — E78.00 HYPERCHOLESTEROLEMIA: Primary | ICD-10-CM

## 2023-08-31 DIAGNOSIS — R93.1 ELEVATED CORONARY ARTERY CALCIUM SCORE: ICD-10-CM

## 2023-08-31 PROCEDURE — 99214 OFFICE O/P EST MOD 30 MIN: CPT | Performed by: EMERGENCY MEDICINE

## 2023-08-31 PROCEDURE — 1159F MED LIST DOCD IN RCRD: CPT | Performed by: EMERGENCY MEDICINE

## 2023-08-31 PROCEDURE — 3008F BODY MASS INDEX DOCD: CPT | Performed by: EMERGENCY MEDICINE

## 2023-08-31 PROCEDURE — 3078F DIAST BP <80 MM HG: CPT | Performed by: EMERGENCY MEDICINE

## 2023-08-31 PROCEDURE — 1160F RVW MEDS BY RX/DR IN RCRD: CPT | Performed by: EMERGENCY MEDICINE

## 2023-08-31 PROCEDURE — 3074F SYST BP LT 130 MM HG: CPT | Performed by: EMERGENCY MEDICINE

## 2023-08-31 RX ORDER — ATORVASTATIN CALCIUM 20 MG/1
20 TABLET, FILM COATED ORAL NIGHTLY
Qty: 90 TABLET | Refills: 1 | Status: SHIPPED | OUTPATIENT
Start: 2023-08-31

## 2023-09-05 ENCOUNTER — OFFICE VISIT (OUTPATIENT)
Dept: FAMILY MEDICINE CLINIC | Facility: CLINIC | Age: 73
End: 2023-09-05
Payer: MEDICARE

## 2023-09-05 VITALS
OXYGEN SATURATION: 99 % | DIASTOLIC BLOOD PRESSURE: 70 MMHG | SYSTOLIC BLOOD PRESSURE: 132 MMHG | TEMPERATURE: 97 F | WEIGHT: 150 LBS | BODY MASS INDEX: 26.58 KG/M2 | RESPIRATION RATE: 18 BRPM | HEIGHT: 63 IN | HEART RATE: 73 BPM

## 2023-09-05 DIAGNOSIS — M79.89 SWELLING OF LEFT LOWER EXTREMITY: Primary | ICD-10-CM

## 2023-09-05 PROCEDURE — 99213 OFFICE O/P EST LOW 20 MIN: CPT | Performed by: PHYSICIAN ASSISTANT

## 2023-09-05 RX ORDER — TERBINAFINE HYDROCHLORIDE 250 MG/1
TABLET ORAL
COMMUNITY
Start: 2023-08-21

## 2023-09-14 ENCOUNTER — APPOINTMENT (OUTPATIENT)
Dept: GENERAL RADIOLOGY | Age: 73
End: 2023-09-14
Attending: EMERGENCY MEDICINE
Payer: MEDICARE

## 2023-09-14 ENCOUNTER — HOSPITAL ENCOUNTER (EMERGENCY)
Age: 73
Discharge: HOME | End: 2023-09-14
Attending: EMERGENCY MEDICINE
Payer: MEDICARE

## 2023-09-14 ENCOUNTER — APPOINTMENT (OUTPATIENT)
Dept: ULTRASOUND IMAGING | Age: 73
End: 2023-09-14
Attending: EMERGENCY MEDICINE
Payer: MEDICARE

## 2023-09-14 VITALS
TEMPERATURE: 98 F | RESPIRATION RATE: 16 BRPM | DIASTOLIC BLOOD PRESSURE: 70 MMHG | OXYGEN SATURATION: 98 % | HEART RATE: 70 BPM | HEIGHT: 63 IN | SYSTOLIC BLOOD PRESSURE: 112 MMHG | WEIGHT: 148 LBS | BODY MASS INDEX: 26.22 KG/M2

## 2023-09-14 DIAGNOSIS — M10.9 ACUTE GOUT OF LEFT ANKLE, UNSPECIFIED CAUSE: Primary | ICD-10-CM

## 2023-09-14 PROCEDURE — 93971 EXTREMITY STUDY: CPT | Performed by: EMERGENCY MEDICINE

## 2023-09-14 PROCEDURE — 99285 EMERGENCY DEPT VISIT HI MDM: CPT

## 2023-09-14 PROCEDURE — 99284 EMERGENCY DEPT VISIT MOD MDM: CPT

## 2023-09-14 PROCEDURE — 73610 X-RAY EXAM OF ANKLE: CPT | Performed by: EMERGENCY MEDICINE

## 2023-09-14 RX ORDER — METHYLPREDNISOLONE 4 MG/1
TABLET ORAL
Qty: 1 EACH | Refills: 0 | Status: SHIPPED | OUTPATIENT
Start: 2023-09-14

## 2023-09-14 NOTE — ED INITIAL ASSESSMENT (HPI)
Pt c/o left ankle and foot pain that started Tuesday. Denies injury, states pain when ambulating. No swelling or discoloration noted.

## 2023-11-02 DIAGNOSIS — N52.9 ERECTILE DYSFUNCTION, UNSPECIFIED ERECTILE DYSFUNCTION TYPE: ICD-10-CM

## 2023-11-02 RX ORDER — SILDENAFIL 50 MG/1
TABLET, FILM COATED ORAL DAILY PRN
Qty: 18 TABLET | Refills: 0 | Status: SHIPPED | OUTPATIENT
Start: 2023-11-02

## 2023-11-02 NOTE — TELEPHONE ENCOUNTER
Rcvd refill request from [place] [id number] for sildenafil 50 mg.     Medication(s) to Refill:   Requested Prescriptions     Pending Prescriptions Disp Refills    Sildenafil Citrate 50 MG Oral Tab 18 tablet 0     Sig: Take 0.5-1 tablets (25-50 mg total) by mouth daily as needed for Erectile Dysfunction.          Reason for Medication Refill being sent to Provider / Reason Protocol Failed:  [] 90 day refill has already been granted  [] Blood Pressure out of range  [] Labs Abnormal/over due  [] Medication not previously prescribed by Provider  [x] Non-Protocol Medication  [] Controlled Substance   [] Due for appointment- no future appointment scheduled  [] No Follow up specified      Last Time Medication was Filled:  4/22/22    Last Office Visit with PCP: 8/31/23    When Patient was Due Back to the Office: feb     Future Appointments:  Future Appointments   Date Time Provider Andrew Anthony   2/15/2024 10:30 AM Janki Mejia MD [facility] [place]       Last Blood Pressures:  BP Readings from Last 2 Encounters:  09/14/23 : 112/70  09/05/23 : 132/70    Action taken:  [] Refill approved per protocol  [x] Routing to provider for approval

## 2024-01-15 DIAGNOSIS — I10 ESSENTIAL HYPERTENSION: ICD-10-CM

## 2024-01-15 DIAGNOSIS — N52.9 ERECTILE DYSFUNCTION, UNSPECIFIED ERECTILE DYSFUNCTION TYPE: ICD-10-CM

## 2024-01-15 RX ORDER — LISINOPRIL AND HYDROCHLOROTHIAZIDE 25; 20 MG/1; MG/1
TABLET ORAL
Qty: 90 TABLET | Refills: 3 | Status: SHIPPED | OUTPATIENT
Start: 2024-01-15

## 2024-01-15 NOTE — TELEPHONE ENCOUNTER
Medication(s) to Refill:   Requested Prescriptions     Pending Prescriptions Disp Refills    SILDENAFIL CITRATE 50 MG Oral Tab [Pharmacy Med Name: SILDENAFIL CITRATE 50 MG Tablet] 18 tablet 3     Sig: TAKE 1/2 TO 1 TABLET (25-50 MG TOTAL) DAILY AS NEEDED FOR ERECTILE DYSFUNCTION.     Signed Prescriptions Disp Refills    LISINOPRIL-HYDROCHLOROTHIAZIDE 20-25 MG Oral Tab 90 tablet 3     Sig: TAKE 1 TABLET EVERY DAY     Authorizing Provider: RICHELLE MATTA     Ordering User: EMEKA JONES         Reason for Medication Refill being sent to Provider / Reason Protocol Failed:  [] 90 day refill has already been granted  [] Blood Pressure out of range  [] Labs Abnormal/over due  [] Medication not previously prescribed by Provider  [x] Non-Protocol Medication  [] Controlled Substance   [] Due for appointment- no future appointment scheduled  [] No Follow up specified      Last Time Medication was Filled:  11/7/2023      Last Office Visit with PCP: 8/31/2023    When Patient was Due Back to the Office:  Feb 2024  (from when PCP last addressed condition)    Future Appointments:  Future Appointments   Date Time Provider Department Center   2/15/2024 10:30 AM Richelle Matta MD [facility] [place]         Last Blood Pressures:  BP Readings from Last 2 Encounters:   09/14/23 112/70   09/05/23 132/70       Action taken:  [] Refill approved per protocol  [x] Routing to provider for approval

## 2024-01-16 RX ORDER — SILDENAFIL 50 MG/1
50 TABLET, FILM COATED ORAL
Qty: 18 TABLET | Refills: 3 | Status: SHIPPED | OUTPATIENT
Start: 2024-01-16

## 2024-02-14 ENCOUNTER — TELEPHONE (OUTPATIENT)
Dept: FAMILY MEDICINE CLINIC | Facility: CLINIC | Age: 74
End: 2024-02-14

## 2024-04-02 ENCOUNTER — TELEPHONE (OUTPATIENT)
Dept: FAMILY MEDICINE CLINIC | Facility: CLINIC | Age: 74
End: 2024-04-02

## 2024-04-16 DIAGNOSIS — E78.00 HYPERCHOLESTEROLEMIA: ICD-10-CM

## 2024-04-16 RX ORDER — ATORVASTATIN CALCIUM 20 MG/1
20 TABLET, FILM COATED ORAL NIGHTLY
Qty: 90 TABLET | Refills: 0 | Status: SHIPPED | OUTPATIENT
Start: 2024-04-16

## 2024-04-22 ENCOUNTER — TELEPHONE (OUTPATIENT)
Dept: FAMILY MEDICINE CLINIC | Facility: CLINIC | Age: 74
End: 2024-04-22

## 2024-04-22 NOTE — TELEPHONE ENCOUNTER
COMPREHENSIVE MEDICATION REVIEW         Glen Morales MRN [id number]    1950 PCP Roxie Matta MD     Comments: Medication history completed by [facility] Pharmacist over the phone on 24. Patient has upcoming AWV with PCP on 24.     After thorough medication review, 4 discrepancies have been identified and corrected on patient's medication list. See updated list below:     Outpatient Encounter Medications as of 2024   Medication Sig    atorvastatin 20 MG Oral Tab TAKE 1 TABLET EVERY NIGHT    LISINOPRIL-HYDROCHLOROTHIAZIDE 20-25 MG Oral Tab TAKE 1 TABLET EVERY DAY    Sildenafil Citrate 50 MG Oral Tab Take 1 tablet (50 mg total) by mouth daily as needed for Erectile Dysfunction.     Medication Assessment:   Reviewed all medications in detail with patient including dose, indication, timing of administration, monitoring parameters, and potential side effects of medications.     Patient reports taking atorvastatin 20 mg daily and lisinopril-hydrochlorothiazide 20-25 mg daily as prescribed. He tells me he will sometimes monitor his BP at home. Did recommend he monitor his blood pressure 2-3 times weekly and bring readings in to all MD appointments for review.     Did provide education and stressed the importance of taking medication just like prescribed to get the most benefit. Patient denies forgetting or missing medication doses.    Thank you,    Jo Ann Quintana, PharmD, 2024, 12:54 PM

## 2024-04-23 ENCOUNTER — LAB ENCOUNTER (OUTPATIENT)
Dept: LAB | Age: 74
End: 2024-04-23
Attending: EMERGENCY MEDICINE
Payer: MEDICARE

## 2024-04-23 ENCOUNTER — OFFICE VISIT (OUTPATIENT)
Dept: FAMILY MEDICINE CLINIC | Facility: CLINIC | Age: 74
End: 2024-04-23
Payer: MEDICARE

## 2024-04-23 VITALS
SYSTOLIC BLOOD PRESSURE: 116 MMHG | HEIGHT: 63 IN | OXYGEN SATURATION: 98 % | BODY MASS INDEX: 25.65 KG/M2 | RESPIRATION RATE: 16 BRPM | HEART RATE: 73 BPM | DIASTOLIC BLOOD PRESSURE: 78 MMHG | WEIGHT: 144.75 LBS

## 2024-04-23 DIAGNOSIS — Z00.00 ENCOUNTER FOR ANNUAL HEALTH EXAMINATION: ICD-10-CM

## 2024-04-23 DIAGNOSIS — Z12.5 PROSTATE CANCER SCREENING: ICD-10-CM

## 2024-04-23 DIAGNOSIS — R93.1 ELEVATED CORONARY ARTERY CALCIUM SCORE: ICD-10-CM

## 2024-04-23 DIAGNOSIS — N18.30 STAGE 3 CHRONIC KIDNEY DISEASE, UNSPECIFIED WHETHER STAGE 3A OR 3B CKD (HCC): ICD-10-CM

## 2024-04-23 DIAGNOSIS — I10 ESSENTIAL HYPERTENSION: ICD-10-CM

## 2024-04-23 DIAGNOSIS — Z00.00 ENCOUNTER FOR ANNUAL HEALTH EXAMINATION: Primary | ICD-10-CM

## 2024-04-23 DIAGNOSIS — E78.00 HYPERCHOLESTEROLEMIA: ICD-10-CM

## 2024-04-23 DIAGNOSIS — Z12.11 SCREENING FOR COLON CANCER: ICD-10-CM

## 2024-04-23 PROBLEM — I25.10 CALCIFICATION OF CORONARY ARTERY: Status: ACTIVE | Noted: 2023-08-21

## 2024-04-23 PROBLEM — I25.84 CALCIFICATION OF CORONARY ARTERY: Status: ACTIVE | Noted: 2023-08-21

## 2024-04-23 LAB
ALBUMIN SERPL-MCNC: 4.1 G/DL (ref 3.4–5)
ALBUMIN/GLOB SERPL: 1 {RATIO} (ref 1–2)
ALP LIVER SERPL-CCNC: 80 U/L
ALT SERPL-CCNC: 24 U/L
ANION GAP SERPL CALC-SCNC: 4 MMOL/L (ref 0–18)
AST SERPL-CCNC: 32 U/L (ref 15–37)
BASOPHILS # BLD AUTO: 0.02 X10(3) UL (ref 0–0.2)
BASOPHILS NFR BLD AUTO: 0.3 %
BILIRUB SERPL-MCNC: 0.6 MG/DL (ref 0.1–2)
BUN BLD-MCNC: 34 MG/DL (ref 9–23)
CALCIUM BLD-MCNC: 9.4 MG/DL (ref 8.5–10.1)
CHLORIDE SERPL-SCNC: 107 MMOL/L (ref 98–112)
CHOLEST SERPL-MCNC: 166 MG/DL (ref ?–200)
CO2 SERPL-SCNC: 25 MMOL/L (ref 21–32)
COMPLEXED PSA SERPL-MCNC: 1.85 NG/ML (ref ?–4)
CREAT BLD-MCNC: 1.96 MG/DL
EGFRCR SERPLBLD CKD-EPI 2021: 35 ML/MIN/1.73M2 (ref 60–?)
EOSINOPHIL # BLD AUTO: 0.06 X10(3) UL (ref 0–0.7)
EOSINOPHIL NFR BLD AUTO: 0.9 %
ERYTHROCYTE [DISTWIDTH] IN BLOOD BY AUTOMATED COUNT: 14.5 %
FASTING PATIENT LIPID ANSWER: YES
FASTING STATUS PATIENT QL REPORTED: YES
GLOBULIN PLAS-MCNC: 4 G/DL (ref 2.8–4.4)
GLUCOSE BLD-MCNC: 107 MG/DL (ref 70–99)
HCT VFR BLD AUTO: 38.3 %
HDLC SERPL-MCNC: 56 MG/DL (ref 40–59)
HGB BLD-MCNC: 12.6 G/DL
IMM GRANULOCYTES # BLD AUTO: 0.02 X10(3) UL (ref 0–1)
IMM GRANULOCYTES NFR BLD: 0.3 %
LDLC SERPL CALC-MCNC: 91 MG/DL (ref ?–100)
LYMPHOCYTES # BLD AUTO: 1.4 X10(3) UL (ref 1–4)
LYMPHOCYTES NFR BLD AUTO: 20.2 %
MCH RBC QN AUTO: 29 PG (ref 26–34)
MCHC RBC AUTO-ENTMCNC: 32.9 G/DL (ref 31–37)
MCV RBC AUTO: 88 FL
MONOCYTES # BLD AUTO: 0.51 X10(3) UL (ref 0.1–1)
MONOCYTES NFR BLD AUTO: 7.4 %
NEUTROPHILS # BLD AUTO: 4.92 X10 (3) UL (ref 1.5–7.7)
NEUTROPHILS # BLD AUTO: 4.92 X10(3) UL (ref 1.5–7.7)
NEUTROPHILS NFR BLD AUTO: 70.9 %
NONHDLC SERPL-MCNC: 110 MG/DL (ref ?–130)
OSMOLALITY SERPL CALC.SUM OF ELEC: 290 MOSM/KG (ref 275–295)
PLATELET # BLD AUTO: 178 10(3)UL (ref 150–450)
POTASSIUM SERPL-SCNC: 4.9 MMOL/L (ref 3.5–5.1)
PROT SERPL-MCNC: 8.1 G/DL (ref 6.4–8.2)
RBC # BLD AUTO: 4.35 X10(6)UL
SODIUM SERPL-SCNC: 136 MMOL/L (ref 136–145)
TRIGL SERPL-MCNC: 103 MG/DL (ref 30–149)
TSI SER-ACNC: 1.09 MIU/ML (ref 0.36–3.74)
VLDLC SERPL CALC-MCNC: 17 MG/DL (ref 0–30)
WBC # BLD AUTO: 6.9 X10(3) UL (ref 4–11)

## 2024-04-23 PROCEDURE — 80053 COMPREHEN METABOLIC PANEL: CPT

## 2024-04-23 PROCEDURE — 85025 COMPLETE CBC W/AUTO DIFF WBC: CPT

## 2024-04-23 PROCEDURE — 36415 COLL VENOUS BLD VENIPUNCTURE: CPT

## 2024-04-23 PROCEDURE — 84443 ASSAY THYROID STIM HORMONE: CPT

## 2024-04-23 PROCEDURE — 80061 LIPID PANEL: CPT

## 2024-04-23 NOTE — PATIENT INSTRUCTIONS
Thank you for choosing [facility]  To Do:  FOR MARCIN MORALES    Follow up in 6 months  Have blood tests done  Continue with all medications  Recommend SHINGRIX vaccine for shingles x 2 doses, check with local pharmacy   Complete stool cards for colon cancer screening                                    Marcin Morales's SCREENING SCHEDULE   Tests on this list are recommended by your physician but may not be covered, or covered at this frequency, by your insurer.   Please check with your insurance carrier before scheduling to verify coverage.   PREVENTATIVE SERVICES FREQUENCY &  COVERAGE DETAILS LAST COMPLETION DATE   Diabetes Screening    Fasting Blood Sugar / Glucose    One screening every 12 months if never tested or if previously tested but not diagnosed with pre-diabetes   One screening every 6 months if diagnosed with pre-diabetes Lab Results   Component Value Date     (H) 08/14/2023        Cardiovascular Disease Screening    Lipid Panel  Cholesterol  Lipoprotein (HDL)  Triglycerides Covered every 5 years for all Medicare beneficiaries without apparent signs or symptoms of cardiovascular disease Lab Results   Component Value Date    CHOLEST 158 05/30/2023    HDL 50 05/30/2023    LDL 90 05/30/2023    TRIG 100 05/30/2023         Electrocardiogram (EKG)   Covered if needed at Welcome to Medicare, and non-screening if indicated for medical reasons -      Ultrasound Screening for Abdominal Aortic Aneurysm (AAA) Covered once in a lifetime for one of the following risk factors    Men who are 65-75 years old and have ever smoked    Anyone with a family history -     Colorectal Cancer Screening  Covered for ages 50-85; only need ONE of the following:    Colonoscopy   Covered every 10 years    Covered every 2 years if patient is at high risk or previous colonoscopy was abnormal 05/28/2019    Health Maintenance   Topic Date Due    Colorectal Cancer Screening  05/28/2024       Flexible Sigmoidoscopy    Covered every 4 years -    Fecal Occult Blood Test Covered annually -   Prostate Cancer Screening    Prostate-Specific Antigen (PSA) Annually No results found for: \"PSA\"  Health Maintenance   Topic Date Due    PSA  02/28/2025      Immunizations    Influenza Covered once per flu season  Please get every year -  No recommendations at this time    Pneumococcal Each vaccine (Czskypa02 & [id number]) covered once after 65 Prevnar 13: 04/25/2019    Sudxarwhd39: 07/16/2020     No recommendations at this time    Hepatitis B One screening covered for patients with certain risk factors   -  No recommendations at this time    Tetanus Toxoid Not covered by Medicare Part B unless medically necessary (cut with metal); may be covered with your pharmacy prescription benefits -    Tetanus, Diptheria and Pertusis TD and TDaP Not covered by Medicare Part B -  No recommendations at this time    Zoster Not covered by Medicare Part B; may be covered with your pharmacy  prescription benefits -  Zoster Vaccines(2 of 2) due on 01/09/2023     Annual Monitoring of Persistent Medications (ACE/ARB, digoxin diuretics, anticonvulsants)    Potassium Annually Lab Results   Component Value Date    K 4.3 08/14/2023         Creatinine   Annually Lab Results   Component Value Date    CREATSERUM 1.83 (H) 08/14/2023         BUN Annually Lab Results   Component Value Date    BUN 30 (H) 08/14/2023       Drug Serum Conc Annually No results found for: \"DIGOXIN\", \"DIG\", \"VALP\"

## 2024-04-23 NOTE — PROGRESS NOTES
Subjective:   Glen Morales is a 73 year old male who presents for a MA (Medicare Advantage) Supervisit (Once per calendar year) and scheduled follow up of multiple significant but stable problems.     HYPERTENSION  ON Lisinopril-hydrochlorothiazide,  Home BP checks normotensive   No cough compliant with meds           HYPERCHOELSTEROLEMIA  On atorvastatin 20 mg  No abd pain or muscle aches  Tries to eat well.  No weight loss  Non smoker  No family history of early CAD           ERECTILE DYSFUNCTION  Takes Viagra as needed, usually 2-3 x a month  Takes 50 mg pill          History/Other:   Fall Risk Assessment:   He has been screened for Falls and is low risk.      Cognitive Assessment:   He had a completely normal cognitive assessment - see flowsheet entries       Functional Ability/Status:   Glen Morales has some abnormal functions as listed below:  He has Hearing problems based on screening of functional status.He has problems with Memory based on screening of functional status.       Depression Screening (PHQ-2/PHQ-9): PHQ-2 SCORE: 0  , done 4/23/2024            Advanced Directives:   He does NOT have a Living Will. [Do you have a living will?: No]  He does NOT have a Power of  for Health Care. [Do you have a healthcare power of ?: No]  Discussed with patient      Patient Active Problem List   Diagnosis    Essential hypertension    Hypercholesterolemia    CKD (chronic kidney disease) stage 3, GFR 30-59 ml/min ([facility])    Erectile dysfunction    Calcification of coronary artery     Allergies:  He is allergic to seasonal.    Current Medications:  Outpatient Medications Marked as Taking for the 4/23/24 encounter (Office Visit) with Roxie Matta MD   Medication Sig    atorvastatin 20 MG Oral Tab TAKE 1 TABLET EVERY NIGHT    Sildenafil Citrate 50 MG Oral Tab Take 1 tablet (50 mg total) by mouth daily as needed for Erectile Dysfunction.    LISINOPRIL-HYDROCHLOROTHIAZIDE 20-25 MG Oral Tab TAKE 1  TABLET EVERY DAY       Medical History:  He  has no past medical history on file.  Surgical History:  He  has no past surgical history on file.   Family History:  His family history includes Diabetes in his mother and sister.  Social History:  He  reports that he has never smoked. He has never used smokeless tobacco. He reports current alcohol use. He reports that he does not use drugs.    Tobacco:  He has never smoked tobacco.    CAGE Alcohol Screen:   He has been screened for alcohol abuse and his score is not 0:  Cut: Have you ever felt you should Cut down on your drinking?: Yes  Annoyed: Have people Annoyed you by criticizing your drinking?: No  Guilty: Have you ever felt bad or Guilty about your drinking?: No  Eye Opener: Have you ever had a drink first thing in the morning to steady your nerves or to get rid of a hangover (Eye opener)?: No  Total Score: 1      Patient Care Team:  Roxie Matta MD as PCP - General (Family Medicine)    Review of Systems  GENERAL: feels well otherwise  SKIN: denies any unusual skin lesions  EYES: denies blurred vision or double vision  HEENT: denies nasal congestion, sinus pain or ST  LUNGS: denies shortness of breath with exertion  CARDIOVASCULAR: denies chest pain on exertion  GI: denies abdominal pain, denies heartburn  :  no complaint of urinary incontinence  MUSCULOSKELETAL: denies back pain  NEURO: denies headaches  PSYCHE: denies depression or anxiety  HEMATOLOGIC: denies hx of anemia  ENDOCRINE: denies thyroid history  ALL/ASTHMA: denies hx of allergy or asthma    Objective:   Physical Exam    /78   Pulse 73   Resp 16   Ht 5' 3\" (1.6 m)   Wt 144 lb 12 oz (65.7 kg)   SpO2 98%   BMI 25.64 kg/m²  Estimated body mass index is 25.64 kg/m² as calculated from the following:    Height as of this encounter: 5' 3\" (1.6 m).    Weight as of this encounter: 144 lb 12 oz (65.7 kg).    Medicare Hearing Assessment:   Hearing Screening    Time taken: 4/23/2024 10:46  AM  Screening Method: Whisper Test  Whisper Test Result: Pass               Assessment & Plan:   Glen Morales is a 73 year old male who presents for a Medicare Assessment.     1. Encounter for annual health examination (Primary)  -     CBC With Differential With Platelet; Future; Expected date: 04/23/2024  -     TSH W Reflex To Free T4; Future; Expected date: 04/23/2024  2. Hypercholesterolemia  -     CBC With Differential With Platelet; Future; Expected date: 04/23/2024  -     TSH W Reflex To Free T4; Future; Expected date: 04/23/2024  -     Lipid Panel; Future; Expected date: 04/23/2024  -     Comp Metabolic Panel (14); Future; Expected date: 04/23/2024    Stable, due for recheck. Cotinue statin, Atorvastatin 20    3. Essential hypertension  -     CBC With Differential With Platelet; Future; Expected date: 04/23/2024  -     TSH W Reflex To Free T4; Future; Expected date: 04/23/2024  Stable contiue with lisinopril hydrochlorothiazide    4. Elevated coronary artery calcium score  -     Lipid Panel; Future; Expected date: 04/23/2024  On statin  Due for recheck    5. Prostate cancer screening  -     PSA Total, Screen; Future; Expected date: 04/23/2024    6. Stage 3 chronic kidney disease, unspecified whether stage 3a or 3b CKD (HCC)  -     Comp Metabolic Panel (14); Future; Expected date: 04/23/2024  Nephrology notes reviewed  Due for recheck    7. Screening for colon cancer  -     Occult Blood, Fecal, FIT Immunoassay; Future; Expected date: 04/23/2024    The patient indicates understanding of these issues and agrees to the plan.      PATIENT INSTRUCTIONS:    Follow up in 6 months  Have blood tests done  Continue with all medications  Recommend SHINGRIX vaccine for shingles x 2 doses, check with local pharmacy   Complete stool cards for colon cancer screening        No follow-ups on file.     Roxie Matta MD, 4/23/2024     Supplementary Documentation:   General Health:  In the past six months, have you lost  more than 10 pounds without trying?: 2 - No  Has your appetite been poor?: No  Type of Diet: Low Salt  How does the patient maintain a good energy level?: Appropriate Exercise  How would you describe your daily physical activity?: Moderate  How would you describe your current health state?: Good  How do you maintain positive mental well-being?: Games;Visiting Family  On a scale of 0 to 10, with 0 being no pain and 10 being severe pain, what is your pain level?: 2 - (Mild)  In the past six months, have you experienced urine leakage?: 0-No  At any time do you feel concerned for the safety/well-being of yourself and/or your children, in your home or elsewhere?: Yes  Have you had any immunizations at another office such as Influenza, Hepatitis B, Tetanus, or Pneumococcal?: No          Glen Morales's SCREENING SCHEDULE   Tests on this list are recommended by your physician but may not be covered, or covered at this frequency, by your insurer.   Please check with your insurance carrier before scheduling to verify coverage.   PREVENTATIVE SERVICES FREQUENCY &  COVERAGE DETAILS LAST COMPLETION DATE   Diabetes Screening    Fasting Blood Sugar / Glucose    One screening every 12 months if never tested or if previously tested but not diagnosed with pre-diabetes   One screening every 6 months if diagnosed with pre-diabetes Lab Results   Component Value Date     (H) 08/14/2023        Cardiovascular Disease Screening    Lipid Panel  Cholesterol  Lipoprotein (HDL)  Triglycerides Covered every 5 years for all Medicare beneficiaries without apparent signs or symptoms of cardiovascular disease Lab Results   Component Value Date    CHOLEST 158 05/30/2023    HDL 50 05/30/2023    LDL 90 05/30/2023    TRIG 100 05/30/2023         Electrocardiogram (EKG)   Covered if needed at Welcome to Medicare, and non-screening if indicated for medical reasons -      Ultrasound Screening for Abdominal Aortic Aneurysm (AAA) Covered once in a  lifetime for one of the following risk factors    Men who are 65-75 years old and have ever smoked    Anyone with a family history -     Colorectal Cancer Screening  Covered for ages 50-85; only need ONE of the following:    Colonoscopy   Covered every 10 years    Covered every 2 years if patient is at high risk or previous colonoscopy was abnormal 05/28/2019    Health Maintenance   Topic Date Due    Colorectal Cancer Screening  05/28/2024       Flexible Sigmoidoscopy   Covered every 4 years -    Fecal Occult Blood Test Covered annually -   Prostate Cancer Screening    Prostate-Specific Antigen (PSA) Annually No results found for: \"PSA\"  Health Maintenance   Topic Date Due    PSA  02/28/2025      Immunizations    Influenza Covered once per flu season  Please get every year -  No recommendations at this time    Pneumococcal Each vaccine ([id number] & [id number]) covered once after 65 Prevnar 13: 04/25/2019    [id number]: 07/16/2020     No recommendations at this time    Hepatitis B One screening covered for patients with certain risk factors   -  No recommendations at this time    Tetanus Toxoid Not covered by Medicare Part B unless medically necessary (cut with metal); may be covered with your pharmacy prescription benefits -    Tetanus, Diptheria and Pertusis TD and TDaP Not covered by Medicare Part B -  No recommendations at this time    Zoster Not covered by Medicare Part B; may be covered with your pharmacy  prescription benefits -  Zoster Vaccines(2 of 2) due on 01/09/2023     Annual Monitoring of Persistent Medications (ACE/ARB, digoxin diuretics, anticonvulsants)    Potassium Annually Lab Results   Component Value Date    K 4.3 08/14/2023         Creatinine   Annually Lab Results   Component Value Date    CREATSERUM 1.83 (H) 08/14/2023         BUN Annually Lab Results   Component Value Date    BUN 30 (H) 08/14/2023       Drug Serum Conc Annually No results found for: \"DIGOXIN\", \"DIG\", \"VALP\"

## 2024-04-25 PROCEDURE — 82274 ASSAY TEST FOR BLOOD FECAL: CPT | Performed by: EMERGENCY MEDICINE

## 2024-05-01 LAB — HEMOCCULT STL QL: NEGATIVE

## 2024-05-03 ENCOUNTER — TELEPHONE (OUTPATIENT)
Dept: FAMILY MEDICINE CLINIC | Facility: CLINIC | Age: 74
End: 2024-05-03

## 2024-05-03 NOTE — TELEPHONE ENCOUNTER
----- Message from Roxie Matta MD sent at 5/2/2024 12:14 PM CDT -----  FIT test negative  Repeat yearly as colo rectal screening

## 2024-06-24 DIAGNOSIS — E78.00 HYPERCHOLESTEROLEMIA: ICD-10-CM

## 2024-06-24 RX ORDER — ATORVASTATIN CALCIUM 20 MG/1
20 TABLET, FILM COATED ORAL NIGHTLY
Qty: 90 TABLET | Refills: 3 | OUTPATIENT
Start: 2024-06-24

## 2024-08-19 DIAGNOSIS — E78.00 HYPERCHOLESTEROLEMIA: ICD-10-CM

## 2024-08-19 RX ORDER — ATORVASTATIN CALCIUM 20 MG/1
20 TABLET, FILM COATED ORAL NIGHTLY
Qty: 90 TABLET | Refills: 3 | Status: SHIPPED | OUTPATIENT
Start: 2024-08-19

## 2024-08-23 ENCOUNTER — OFFICE VISIT (OUTPATIENT)
Dept: FAMILY MEDICINE CLINIC | Facility: CLINIC | Age: 74
End: 2024-08-23
Payer: MEDICARE

## 2024-08-23 VITALS
HEART RATE: 86 BPM | OXYGEN SATURATION: 98 % | WEIGHT: 146 LBS | HEIGHT: 63 IN | RESPIRATION RATE: 16 BRPM | BODY MASS INDEX: 25.87 KG/M2 | SYSTOLIC BLOOD PRESSURE: 131 MMHG | DIASTOLIC BLOOD PRESSURE: 77 MMHG | TEMPERATURE: 99 F

## 2024-08-23 DIAGNOSIS — M10.9 ACUTE GOUT OF RIGHT FOOT, UNSPECIFIED CAUSE: Primary | ICD-10-CM

## 2024-08-23 PROCEDURE — 3078F DIAST BP <80 MM HG: CPT | Performed by: NURSE PRACTITIONER

## 2024-08-23 PROCEDURE — 3008F BODY MASS INDEX DOCD: CPT | Performed by: NURSE PRACTITIONER

## 2024-08-23 PROCEDURE — 99213 OFFICE O/P EST LOW 20 MIN: CPT | Performed by: NURSE PRACTITIONER

## 2024-08-23 PROCEDURE — 1160F RVW MEDS BY RX/DR IN RCRD: CPT | Performed by: NURSE PRACTITIONER

## 2024-08-23 PROCEDURE — 1159F MED LIST DOCD IN RCRD: CPT | Performed by: NURSE PRACTITIONER

## 2024-08-23 PROCEDURE — 3075F SYST BP GE 130 - 139MM HG: CPT | Performed by: NURSE PRACTITIONER

## 2024-08-23 RX ORDER — METHYLPREDNISOLONE 4 MG
TABLET, DOSE PACK ORAL
Qty: 1 EACH | Refills: 0 | Status: SHIPPED | OUTPATIENT
Start: 2024-08-23

## 2024-08-23 NOTE — PROGRESS NOTES
CHIEF COMPLAINT:     Chief Complaint   Patient presents with    Swelling     Top of right foot swelling 2 days,  redness and swelling with pain.         HPI:     Glen Morales is a 74 year old male who presents with concerns of pain, swelling, redness to top of right foot x 2 days. no drainage from area.  Symptoms have been worsening since onset, pain kept pt from sleeping last night.  Precipitating event: pt had eaten more red meat recently.  Treatments: ice.  Associated symptoms include: none.  Denies fever, streaking of wound, or other signs of systemic illness.  Pt had episode of gout @ 1 year ago treated in ED       Current Outpatient Medications   Medication Sig Dispense Refill    methylPREDNISolone 4 MG Oral Tablet Therapy Pack Use as directed 1 each 0    ATORVASTATIN 20 MG Oral Tab TAKE 1 TABLET EVERY NIGHT 90 tablet 3    Sildenafil Citrate 50 MG Oral Tab Take 1 tablet (50 mg total) by mouth daily as needed for Erectile Dysfunction. 18 tablet 3    LISINOPRIL-HYDROCHLOROTHIAZIDE 20-25 MG Oral Tab TAKE 1 TABLET EVERY DAY 90 tablet 3      History reviewed. No pertinent past medical history.   Social History:  Social History     Socioeconomic History    Marital status:    Tobacco Use    Smoking status: Never    Smokeless tobacco: Never   Vaping Use    Vaping status: Never Used   Substance and Sexual Activity    Alcohol use: Yes    Drug use: No        REVIEW OF SYSTEMS:   GENERAL: feels well otherwise, no fever, no chills.  SKIN: as above.  CHEST: no chest pains, no palpitations.  LUNGS: denies shortness of breath with exertion or rest. No wheezing, no cough.  LYMPH: no enlargement of the lymph nodes.  MUSC/SKEL: no joint swelling, no joint stiffness.  NEURO: no abnormal sensation, no tingling of the skin or numbness.    EXAM:   /77   Pulse 86   Temp 99.2 °F (37.3 °C) (Oral)   Resp 16   Ht 5' 3\" (1.6 m)   Wt 146 lb (66.2 kg)   SpO2 98%   BMI 25.86 kg/m²   GENERAL: well developed, well  nourished,in no apparent distress  SKIN: C/D/I, no rash  HEAD: atraumatic, normocephalic  EYES: conjunctiva clear, EOM intact  NOSE: Normal external nose.  No rhinorrhea.  NECK: supple, non-tender  LUNGS: clear to auscultation bilaterally, no wheezes or rhonchi. Breathing is non labored.  CARDIO: RRR without murmur  EXTREMITIES: right fore foot mild erythema, mild swelling, pain to touch. Cap refill brisk- less than 2 seconds.   PSYCH: pleasant mood and affect  NEURO: no focal deficits      ASSESSMENT AND PLAN:     ASSESSMENT:  Encounter Diagnosis   Name Primary?    Acute gout of right foot, unspecified cause Yes       PLAN:   Instructions and Comfort Care as listed in Patient Instructions.    Medication as below.    Requested Prescriptions     Signed Prescriptions Disp Refills    methylPREDNISolone 4 MG Oral Tablet Therapy Pack 1 each 0     Sig: Use as directed     Risks, benefits, side effects of medication explained and discussed.     There are no Patient Instructions on file for this visit.    The patient indicates understanding of these issues and agrees to the plan.  The patient is asked to see PCP in 3 days if symptoms not improving or for worsening symptoms.

## 2024-09-27 ENCOUNTER — OFFICE VISIT (OUTPATIENT)
Dept: FAMILY MEDICINE CLINIC | Facility: CLINIC | Age: 74
End: 2024-09-27
Payer: MEDICARE

## 2024-09-27 VITALS
SYSTOLIC BLOOD PRESSURE: 122 MMHG | DIASTOLIC BLOOD PRESSURE: 60 MMHG | TEMPERATURE: 98 F | WEIGHT: 143 LBS | BODY MASS INDEX: 25 KG/M2 | OXYGEN SATURATION: 97 % | RESPIRATION RATE: 16 BRPM | HEART RATE: 78 BPM

## 2024-09-27 DIAGNOSIS — M10.9 ACUTE GOUT OF RIGHT FOOT, UNSPECIFIED CAUSE: Primary | ICD-10-CM

## 2024-09-27 PROCEDURE — 1159F MED LIST DOCD IN RCRD: CPT | Performed by: NURSE PRACTITIONER

## 2024-09-27 PROCEDURE — 3074F SYST BP LT 130 MM HG: CPT | Performed by: NURSE PRACTITIONER

## 2024-09-27 PROCEDURE — 99213 OFFICE O/P EST LOW 20 MIN: CPT | Performed by: NURSE PRACTITIONER

## 2024-09-27 PROCEDURE — 1160F RVW MEDS BY RX/DR IN RCRD: CPT | Performed by: NURSE PRACTITIONER

## 2024-09-27 PROCEDURE — 3078F DIAST BP <80 MM HG: CPT | Performed by: NURSE PRACTITIONER

## 2024-09-27 RX ORDER — METHYLPREDNISOLONE 4 MG
TABLET, DOSE PACK ORAL
Qty: 1 EACH | Refills: 0 | Status: SHIPPED | OUTPATIENT
Start: 2024-09-27

## 2024-09-27 NOTE — PROGRESS NOTES
CHIEF COMPLAINT:     Chief Complaint   Patient presents with    Swelling Edema     Possible gout at right outer foot at for 2 days, worst yesterday and today.  No meds taken.         HPI:   Glen Morales is a 74 year old male  who presents with complaints of \"gout.\" Top of right foot has been hurting for 3 days, worse the past 2.  Patient has history of gout in the past.  Pain is rated as a 5/10.   Pain does not radiate. No weakness; no numbness or tingling. No known injury. Treatments tried include: none.  Reports he has been eating a lot of red meat.          Current Outpatient Medications   Medication Sig Dispense Refill    methylPREDNISolone (MEDROL) 4 MG Oral Tablet Therapy Pack As directed. 1 each 0    methylPREDNISolone 4 MG Oral Tablet Therapy Pack Use as directed 1 each 0    ATORVASTATIN 20 MG Oral Tab TAKE 1 TABLET EVERY NIGHT 90 tablet 3    Sildenafil Citrate 50 MG Oral Tab Take 1 tablet (50 mg total) by mouth daily as needed for Erectile Dysfunction. 18 tablet 3    LISINOPRIL-HYDROCHLOROTHIAZIDE 20-25 MG Oral Tab TAKE 1 TABLET EVERY DAY 90 tablet 3      No past medical history on file.   No past surgical history on file.   Family History   Problem Relation Age of Onset    Diabetes Mother     Diabetes Sister       Social History:  Social History     Socioeconomic History    Marital status:    Tobacco Use    Smoking status: Never    Smokeless tobacco: Never   Vaping Use    Vaping status: Never Used   Substance and Sexual Activity    Alcohol use: Yes    Drug use: No           REVIEW OF SYSTEMS:   GENERAL: Denies fever, chills, decreased appetite  SKIN: Denies rashes, skin wounds or ulcers.  EYES: Denies blurred vision or double vision  HENT: Denies congestion, rhinorrhea, sore throat or ear pain  CHEST: Denies chest pain, or palpitations  LUNGS: Denies shortness of breath, cough, or wheezing  GI: Denies abdominal pain, N/V/C/D.   MUSCULOSKELETAL: no other arthralgia or swollen joints  NEURO: Denies  loss of sensation, numbness or tingling.     EXAM:   /60   Pulse 78   Temp 98.1 °F (36.7 °C) (Oral)   Resp 16   Wt 143 lb (64.9 kg)   SpO2 97%   BMI 25.33 kg/m²   GENERAL: well developed, well nourished,in no apparent distress  SKIN: no rashes,no suspicious lesions  HEAD: atraumatic, normocephalic  LUNGS: clear to auscultation bilaterally, no wheezes or rhonchi. Breathing is non labored.  CARDIO: Regular rate, without murmur.  LOWER EXTREMITIES: lateral portion of dorsal aspect of right foot erythematous, edematous, and warm to touch . ROM of great toe diminished due to pain.  left foot without erythema, edema, or warmth.  Dorsalis pedis and posterior tibialis pulses intact and equal bilaterally.  Sensation intact.   NEURO: Patellar and achilles DTR's intact and equal bilaterally.    ASSESSMENT AND PLAN:     ASSESSMENT:  Encounter Diagnosis   Name Primary?    Acute gout of right foot, unspecified cause Yes         PLAN:  Will treat with anti inflammatory as listed below.  Discussed maintenance medications, but patient needs to see PCP for this.  Stressed importance of proper follow up if no improvement in pain or for signs of acute kidney injury or GI bleeding.  Instructions as listed below.  Risks, benefits, and side effects of medication explained and discussed.  The patient is asked to see her PCP if no improvement in pain in 2 days.    Meds & Refills for this Visit:@[email or web address]      Patient Instructions   Follow up with PCP        The patient indicates understanding of these issues and agrees to the plan.

## 2024-10-05 ENCOUNTER — HOSPITAL ENCOUNTER (EMERGENCY)
Age: 74
Discharge: HOME | End: 2024-10-05
Attending: EMERGENCY MEDICINE
Payer: MEDICARE

## 2024-10-05 ENCOUNTER — APPOINTMENT (OUTPATIENT)
Dept: GENERAL RADIOLOGY | Age: 74
End: 2024-10-05
Attending: PHYSICIAN ASSISTANT
Payer: MEDICARE

## 2024-10-05 ENCOUNTER — OFFICE VISIT (OUTPATIENT)
Dept: FAMILY MEDICINE CLINIC | Facility: CLINIC | Age: 74
End: 2024-10-05
Payer: MEDICARE

## 2024-10-05 VITALS
OXYGEN SATURATION: 99 % | SYSTOLIC BLOOD PRESSURE: 134 MMHG | WEIGHT: 143 LBS | TEMPERATURE: 97 F | HEART RATE: 65 BPM | BODY MASS INDEX: 26.31 KG/M2 | DIASTOLIC BLOOD PRESSURE: 84 MMHG | HEIGHT: 62 IN | RESPIRATION RATE: 16 BRPM

## 2024-10-05 VITALS
SYSTOLIC BLOOD PRESSURE: 137 MMHG | HEIGHT: 61 IN | WEIGHT: 144 LBS | TEMPERATURE: 98 F | BODY MASS INDEX: 27.19 KG/M2 | HEART RATE: 70 BPM | DIASTOLIC BLOOD PRESSURE: 73 MMHG | OXYGEN SATURATION: 98 % | RESPIRATION RATE: 18 BRPM

## 2024-10-05 DIAGNOSIS — M79.89 SWELLING OF RIGHT FOOT: Primary | ICD-10-CM

## 2024-10-05 DIAGNOSIS — M10.9 ACUTE GOUT OF RIGHT FOOT, UNSPECIFIED CAUSE: Primary | ICD-10-CM

## 2024-10-05 PROCEDURE — 99284 EMERGENCY DEPT VISIT MOD MDM: CPT

## 2024-10-05 PROCEDURE — 99215 OFFICE O/P EST HI 40 MIN: CPT | Performed by: NURSE PRACTITIONER

## 2024-10-05 PROCEDURE — 73630 X-RAY EXAM OF FOOT: CPT | Performed by: PHYSICIAN ASSISTANT

## 2024-10-05 PROCEDURE — 3008F BODY MASS INDEX DOCD: CPT | Performed by: NURSE PRACTITIONER

## 2024-10-05 PROCEDURE — 99283 EMERGENCY DEPT VISIT LOW MDM: CPT

## 2024-10-05 PROCEDURE — 3079F DIAST BP 80-89 MM HG: CPT | Performed by: NURSE PRACTITIONER

## 2024-10-05 PROCEDURE — 3075F SYST BP GE 130 - 139MM HG: CPT | Performed by: NURSE PRACTITIONER

## 2024-10-05 RX ORDER — LISINOPRIL 20 MG/1
20 TABLET ORAL DAILY
Qty: 30 TABLET | Refills: 0 | Status: SHIPPED | OUTPATIENT
Start: 2024-10-05 | End: 2024-10-10

## 2024-10-05 RX ORDER — METHYLPREDNISOLONE 4 MG
TABLET, DOSE PACK ORAL
Qty: 1 EACH | Refills: 0 | Status: SHIPPED | OUTPATIENT
Start: 2024-10-05 | End: 2024-10-10 | Stop reason: ALTCHOICE

## 2024-10-05 NOTE — ED PROVIDER NOTES
Patient was seen by a primary care provider at the end of August for right foot pain.  Patient was treated with Medrol Dosepak at that time.  Patient returned about a month later complaining of recurrent symptoms.  He was again treated with a Medrol Dosepak.  He noted improvement while taking the medication but the symptoms returned as the medicines were weaned off.  Patient is on lisinopril hydrochlorothiazide      On examination, is alert adult man who appears uncomfortable.  The right foot appears swollen over the dorsal aspect and there is some faint 9 sharply demarcated blanching erythema noted diffusely over the lateral aspect of the foot.  There is tenderness here.  No proximal lymphangitis.  The ankle has good nontender active range of motion as to the digits.  Descriptive intact.  Distal sensation intact to light touch      Patient likely has gout flare.  Underlying bony abnormality such as proximal fifth metatarsal fracture include the differential.  X-ray imaging warranted.    X-ray foot  I personally reviewed the actual radiographs themselves and my individual interpretation shows no fracture  radiologist's formal interpretation which I have reviewed  CONCLUSION:  No acute fractures.  Osteoarthritic changes greatest at the 1st MTP joint.       In reviewing patient's medical records, I note a visit back in September 2023 for pain and swelling of the left ankle.  Gout was suspected and he was to his steroids at that time  In reviewing his records, I note that patient does not seem to have had any uric acid testing or other laboratory investigations.  Patient admits he has not yet spoken to his primary care doctor regarding this.  I encouraged him to do so.  He should call on Monday for an appointment to be seen this week.  Outpatient laboratory workup can be considered.  I also note in his medical history that he is on hydrochlorothiazide which is not an uncommon precipitant for gout and may account for  his recurrent symptoms.  I also strongly encourage patient to follow a gout diet    I provided a substantive portion of care for this patient. I personally performed the medical decision making for this encounter.

## 2024-10-05 NOTE — PROGRESS NOTES
Patient presents to clinic for reevaluation of right foot pain. He was seen on 8/23/24 with suspected gout and treated with a Medrol Dosepack. He improved but returned on 9/27/24 for repeated symptoms of right foot swelling and pain. He was treated again with a Medrol dosepack and completed the dose three days ago, he notes some improvement while taking the medication but notes his pain and swelling have returned with difficulty walking. He denies any known injury or fall. He has a suspected history of gout beginning in 9/2023 but no available lab work for uric acid levels noted. He has tried nothing additionally for pain.  No past medical history on file.  No past surgical history on file.  Current Outpatient Medications on File Prior to Visit   Medication Sig Dispense Refill    ATORVASTATIN 20 MG Oral Tab TAKE 1 TABLET EVERY NIGHT 90 tablet 3    Sildenafil Citrate 50 MG Oral Tab Take 1 tablet (50 mg total) by mouth daily as needed for Erectile Dysfunction. 18 tablet 3    LISINOPRIL-HYDROCHLOROTHIAZIDE 20-25 MG Oral Tab TAKE 1 TABLET EVERY DAY 90 tablet 3    methylPREDNISolone (MEDROL) 4 MG Oral Tablet Therapy Pack As directed. (Patient not taking: Reported on 10/5/2024) 1 each 0    methylPREDNISolone 4 MG Oral Tablet Therapy Pack Use as directed (Patient not taking: Reported on 10/5/2024) 1 each 0     No current facility-administered medications on file prior to visit.       /84   Pulse 65   Temp 97.4 °F (36.3 °C)   Resp 16   Ht 5' 2\" (1.575 m)   Wt 143 lb (64.9 kg)   SpO2 99%   BMI 26.16 kg/m²     GENERAL: well developed, well nourished,in no apparent distress while seated comfortably in chair  HEENT: PERLLA, conjunctiva clear, atraumatic, normocephalic  LUNGS: breathing is non labored  CARDIO: RRR without murmur  MUSCULOSKELETAL: gait off, right foot with swelling to the midfoot laterally, redness and warmth present, sensation intact with pulses equal bilaterally  NEURO: CN 2-12 grossly  intact    Accompanied by: self  After triage, higher acuity of care was recommended to Glen   today.   Rationale: Need for further evaluation and management outside the scope of practice for the walk in clinic  Site recommendation: [facility] ER for further workup. No imaging performed with repeated suspicion of gout, no improvement with steroid, GFR 35 on last lab exam 4/2024.   Patient verbalized understanding of rationale for further evaluation and was stable upon discharge. Will proceed there now for evaluation.

## 2024-10-05 NOTE — ED PROVIDER NOTES
Patient Seen in: [facility] [place]      History     Chief Complaint   Patient presents with    Leg or Foot Injury     Stated Complaint: right foot swelling    Subjective:   HPI    74-year-old male with known history of hypertension hyperlipidemia and gouty arthritis comes in today from the walk-in clinic.  Patient was seen in August for right foot gout and was placed on Medrol Dosepak symptoms resolved approximately a month later on September 27 the symptoms recurred and was given a Medrol Dosepak from the walk-in clinic at this time patient states he felt improved following the statin but symptoms recurred within a few days of stopping this.  He was seen at the walk-in clinic and they recommended he be evaluated here as he has not had any imaging.  On lisinopril hydrochlorothiazide.    Objective:     Past Medical History:    Essential hypertension    Hyperlipidemia              History reviewed. No pertinent surgical history.             Social History     Socioeconomic History    Marital status:    Tobacco Use    Smoking status: Never    Smokeless tobacco: Never   Vaping Use    Vaping status: Never Used   Substance and Sexual Activity    Alcohol use: Yes    Drug use: No                  Physical Exam     ED Triage Vitals [10/05/24 1003]   BP (!) 160/94   Pulse 80   Resp 18   Temp 98 °F (36.7 °C)   Temp src Temporal   SpO2 100 %   O2 Device None (Room air)       Current Vitals:   Vital Signs  BP: 137/73  Pulse: 70  Resp: 18  Temp: 98 °F (36.7 °C)  Temp src: Temporal    Oxygen Therapy  SpO2: 98 %  O2 Device: None (Room air)        Physical Exam  GENERAL: well developed, well nourished,in no apparent distress while seated comfortably in chair  HEENT: PERLLA, conjunctiva clear, atraumatic, normocephalic  LUNGS: breathing is non labored  CARDIO: RRR without murmur  MUSCULOSKELETAL: gait off, right foot with mild swelling to the midfoot laterally, redness and warmth present to lateral  5th metatarsal, sensation intact with pulses equal bilaterally  NEURO: CN 2-12 grossly intact    ED Course   Labs Reviewed - No data to display  XR FOOT, COMPLETE (MIN 3 VIEWS), RIGHT (CPT=73630)    Result Date: 10/5/2024  PROCEDURE:  XR FOOT, COMPLETE (MIN 3 VIEWS), RIGHT (CPT=73630)  TECHNIQUE:  AP, oblique, and lateral views were obtained.  COMPARISON:  None.  INDICATIONS:  right foot swelling  PATIENT STATED HISTORY: (As transcribed by Technologist)  Patient states right foot pain in the lateral 5th metatarsal area with no known trauma or injury. Patient was recently treated for gout.    FINDINGS:  No acute fractures or osseous lesions are identified.  Phalangeal relationships are within normal limits.  No significant ankle joint effusion.  Vascular calcifications.  Osteoarthritic changes greatest the 1st MTP joint.            CONCLUSION:  No acute fractures.  Osteoarthritic changes greatest at the 1st MTP joint.   LOCATION:  [facility]   Dictated by (CST): Elaina Garza MD on 10/05/2024 at 10:36 AM     Finalized by (CST): Elaina Garza MD on 10/05/2024 at 10:36 AM             MDM      I personally reviewed the patients foot x-ray no evidence of acute fracture or dislocation of the fifth metatarsal    September 2024 and August 2024 in the walk-in clinic where gout was suspected and Medrol Dosepak was prescribed.  Laboratory testing completed since April of this year.  Patient is still taking hydrochlorothiazide which is a possible precipitant for gout.  We will stop this and start patient on just lisinopril I would like him to call and schedule his own appointment with PCP.    Differential diagnosis acute fracture, gouty arthritis, cellulitis    Medical Decision Making      Disposition and Plan     Clinical Impression:  1. Acute gout of right foot, unspecified cause         Disposition:  Discharge  10/5/2024 10:36 am    Follow-up:  Roxie Matta MD  [address] [place] [address]  [id number]  [phone]    Schedule an appointment as soon as possible for a visit in 2 day(s)            Medications Prescribed:  Discharge Medication List as of 10/5/2024 10:46 AM        START taking these medications    Details   lisinopril 20 MG Oral Tab Take 1 tablet (20 mg total) by mouth daily., Normal, Disp-30 tablet, R-0      !! methylPREDNISolone (MEDROL) 4 MG Oral Tablet Therapy Pack Dosepack: take as directed, Normal, Disp-1 each, R-0       !! - Potential duplicate medications found. Please discuss with provider.              Supplementary Documentation:
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
## 2024-10-05 NOTE — ED INITIAL ASSESSMENT (HPI)
Pt reports rt foot pain and swelling since last week, pt went to IC and was dx with gout, took medrol dose pack, felt better for a couple days but pain is now worse.

## 2024-10-07 ENCOUNTER — PATIENT OUTREACH (OUTPATIENT)
Dept: CASE MANAGEMENT | Age: 74
End: 2024-10-07

## 2024-10-07 DIAGNOSIS — Z02.9 ENCOUNTERS FOR UNSPECIFIED ADMINISTRATIVE PURPOSE: Primary | ICD-10-CM

## 2024-10-07 PROCEDURE — 1111F DSCHRG MED/CURRENT MED MERGE: CPT

## 2024-10-08 NOTE — PROGRESS NOTES
Transitions of Care Navigation  Discharge Date: 10/5/24  Contact Date: 10/8/2024    Transitions of Care Assessment:  MARK Initial Assessment    General:  Assessment completed with: Patient  Patient Subjective: Spoke with patient with Faroese interpretor.  Patient reports he is feeling very good, better and foot pain has decreased.  Currently his pain is 2/10.  He has picked up both medications and and started them.  Confirmed with patient he is not taking lisinopril-hydrochlorothiazide and only taking lisinopril 20 mg that was prescribed for him yesterday.  Patient did not have any questions regarding new medications.  He is following the gout diet.  Patient does not have a blood pressure monitor.  Patient was advised to purchase one over the counter and it should be one with an arm cuff.  Patient instructed to check blood pressure once daily. Patient denies the following symptoms:  headache, dizziness, chest pain, shortness of breath, abdominal pain, nausea or vomiting. Emergency room follow up appointment scheduled.  Chief Complaint: Acute gout of right foot  Verify patient name and  with patient/ caregiver: Yes    Hospital Stay/Discharge:  Tell me what you understand of why you were in the hospital or emergency department: I had foot pain  Prior to leaving the hospital were your Discharge Instructions reviewed with you?: Yes  Did you receive a copy of your written Discharge Instructions?: Yes  What questions do you have about your Discharge Instructions?: Patient did not have any questions  Do you feel better or worse since you left the hospital or emergency department?: Better    Follow - Up Appointment:  Do you have a follow-up appointment?: Yes  Date: 10/10/24  Physician: Dr. Matta  Are there any barriers to getting to your follow-up appointment?: No    Home Health/DME:  Prior to leaving the hospital was Home Health (HH) arranged for you?: N/A  Are HH needs identified by staff during the assessment?: No      Prior to leaving the hospital or emergency department was Durable Medical Equipment (DME), medical supplies, or infusions arranged for you?: N/A  Are DME/medical supply/infusions needs identified by staff during this assessment?: No     Medications/Diet:  Did any of your medications change, during or after your hospital stay or ED visit?: Yes  Do you have your new or updated medications?: Yes  Do you understand what your medications are for and possible side effects?: Yes  Are there any reasons that keep you from taking your medication as prescribed?: No  Any concerns about medication refills?: No    Were you given a different diet per your Discharge Instructions?: Yes  Diet Type: gout diet  Reason: to decrease gout flares  Are there any barriers to following that diet?: No     Questions/Concerns:  Do you have any questions or concerns that have not been discussed?: No       Nursing Interventions:  Scheduled emergency room follow up with primary care physician.  Reviewed new medications.  Confirmed patient stop combo HTN medication.  Stressed importance of checking blood pressure daily.     Medications:  Reviewed medications with patient.  Also, discussed new medication and potential side effects.  Confirmed with patient he is no longer taking combo lisinopril-hydrochlorothiazide.Patient did not have any additional questions.     Current Outpatient Medications   Medication Sig Dispense Refill    lisinopril 20 MG Oral Tab Take 1 tablet (20 mg total) by mouth daily. 30 tablet 0    methylPREDNISolone (MEDROL) 4 MG Oral Tablet Therapy Pack Dosepack: take as directed 1 each 0    ATORVASTATIN 20 MG Oral Tab TAKE 1 TABLET EVERY NIGHT 90 tablet 3    Sildenafil Citrate 50 MG Oral Tab Take 1 tablet (50 mg total) by mouth daily as needed for Erectile Dysfunction. 18 tablet 3    methylPREDNISolone (MEDROL) 4 MG Oral Tablet Therapy Pack As directed. (Patient not taking: Reported on 10/5/2024) 1 each 0    methylPREDNISolone 4  MG Oral Tablet Therapy Pack Use as directed (Patient not taking: Reported on 10/5/2024) 1 each 0    LISINOPRIL-HYDROCHLOROTHIAZIDE 20-25 MG Oral Tab TAKE 1 TABLET EVERY DAY (Patient not taking: Reported on 10/8/2024) 90 tablet 3       Diagnosis specifics:  (Please enter following SmartPhrase as applicable)  AMI: .TOCAMI  CHF: .TOCCHF  COPD: .TOCCOPD  CVA: .TOCCVA  CV surgery: .TOCCVSURGERY  Pneumonia: .TOCPNEUMONIA  Re-admit: .TOCREADMIT  Warfarin/Coumadin: .TOCWARFARIN    Follow-up Appointments:  Your appointments       Date & Time Appointment Department ([place])    Oct 10, 2024 9:30 AM CDT Hospital Follow Up with Roxie Matta MD [address] ([facility])        Oct 24, 2024 10:00 AM CDT Follow Up Visit with Roxie Matta MD [address] ([facility])              [address]  [address] [place] [id number]  [phone]            Transitional Care Clinic  Was TCC Ordered: No      Primary Care Provider (If no TCC appointment)  Does patient already have a PCP appointment scheduled? No  Nurse Care Manager Scheduled PCP office ER Follow-up appointment with patient      Specialist  Does the patient have any other follow-up appointment(s) need to be scheduled? No     [x]  Patient verbally agrees to additional follow-up calls from Nurse Care Manager    Book By Date: 10/12/24

## 2024-10-10 ENCOUNTER — OFFICE VISIT (OUTPATIENT)
Dept: FAMILY MEDICINE CLINIC | Facility: CLINIC | Age: 74
End: 2024-10-10
Payer: MEDICARE

## 2024-10-10 VITALS
HEIGHT: 61 IN | RESPIRATION RATE: 20 BRPM | OXYGEN SATURATION: 98 % | DIASTOLIC BLOOD PRESSURE: 82 MMHG | HEART RATE: 88 BPM | WEIGHT: 145 LBS | BODY MASS INDEX: 27.38 KG/M2 | SYSTOLIC BLOOD PRESSURE: 138 MMHG

## 2024-10-10 DIAGNOSIS — E78.00 HYPERCHOLESTEROLEMIA: ICD-10-CM

## 2024-10-10 DIAGNOSIS — Z87.39 HISTORY OF GOUT: ICD-10-CM

## 2024-10-10 DIAGNOSIS — I10 ESSENTIAL HYPERTENSION: Primary | ICD-10-CM

## 2024-10-10 PROCEDURE — 99214 OFFICE O/P EST MOD 30 MIN: CPT | Performed by: EMERGENCY MEDICINE

## 2024-10-10 PROCEDURE — 1159F MED LIST DOCD IN RCRD: CPT | Performed by: EMERGENCY MEDICINE

## 2024-10-10 PROCEDURE — 3008F BODY MASS INDEX DOCD: CPT | Performed by: EMERGENCY MEDICINE

## 2024-10-10 PROCEDURE — 3079F DIAST BP 80-89 MM HG: CPT | Performed by: EMERGENCY MEDICINE

## 2024-10-10 PROCEDURE — 1160F RVW MEDS BY RX/DR IN RCRD: CPT | Performed by: EMERGENCY MEDICINE

## 2024-10-10 PROCEDURE — 3075F SYST BP GE 130 - 139MM HG: CPT | Performed by: EMERGENCY MEDICINE

## 2024-10-10 RX ORDER — AMLODIPINE BESYLATE 5 MG/1
5 TABLET ORAL DAILY
Qty: 90 TABLET | Refills: 1 | Status: SHIPPED | OUTPATIENT
Start: 2024-10-10 | End: 2025-10-05

## 2024-10-10 RX ORDER — LISINOPRIL 20 MG/1
20 TABLET ORAL DAILY
Qty: 90 TABLET | Refills: 1 | Status: SHIPPED | OUTPATIENT
Start: 2024-11-01

## 2024-10-10 NOTE — PROGRESS NOTES
Chief Complaint:   Chief Complaint   Patient presents with    ER F/U     HPI:   This is a 74 year old male       ER FOLLOW UP  Seein in the ER for foot pain  Dx with gout and was rxd medrol dose pack  Hydrochlorothiazide stopped   Patient states that his symptoms are much improved pain to the right foot has resolved      BP Readings from Last 5 Encounters:   10/10/24 138/82   10/05/24 137/73   10/05/24 134/84   09/27/24 122/60   08/23/24 131/77         HYPERTENSION  ON Lisinopril 20   Home BP checks normotensive   No cough compliant with meds           HYPERCHOELSTEROLEMIA  On atorvastatin 20 mg  No abd pain or muscle aches  Tries to eat well.  No weight loss  Non smoker  No family history of early CAD           ERECTILE DYSFUNCTION  Takes Viagra as needed, usually 2-3 x a month  Takes 50 mg pill        PMSH       Past Medical History:    Essential hypertension    Hyperlipidemia     History reviewed. No pertinent surgical history.  Social History:  Social History     Socioeconomic History    Marital status:    Tobacco Use    Smoking status: Never    Smokeless tobacco: Never   Vaping Use    Vaping status: Never Used   Substance and Sexual Activity    Alcohol use: Yes    Drug use: No     Social Drivers of Health     Financial Resource Strain: Low Risk  (10/8/2024)    Financial Resource Strain     Med Affordability: No   Transportation Needs: No Transportation Needs (10/8/2024)    Transportation Needs     Lack of Transportation: No     Family History:  Family History   Problem Relation Age of Onset    Diabetes Mother     Diabetes Sister      Allergies:  Allergies[1]  Current Meds:  Medications Ordered Prior to Encounter[2]   Counseling given: Not Answered         PROBLEM LIST     Patient Active Problem List   Diagnosis    Essential hypertension    Hypercholesterolemia    CKD (chronic kidney disease) stage 3, GFR 30-59 ml/min ([facility])    Erectile dysfunction    Calcification of coronary artery         REVIEW OF  SYSTEMS:   Review of systems significant for history of foot pain  The rest of the review of systems is negative except those stated as above    PHYSICAL EXAM:   /82   Pulse 88   Resp 20   Ht 5' 1\" (1.549 m)   Wt 145 lb (65.8 kg)   SpO2 98%   BMI 27.40 kg/m²  Estimated body mass index is 27.4 kg/m² as calculated from the following:    Height as of this encounter: 5' 1\" (1.549 m).    Weight as of this encounter: 145 lb (65.8 kg).   Vital signs reviewed.Appears stated age, well groomed.  GENERAL: well developed, well nourished, well hydrated, no distress  SKIN: good skin turgor, no obvious rashes  HEENT: atraumatic, normocephalic, ears, nose and throat are clear  EYES: sclera non icteric bilateral  NECK: supple, no adenopathy, no thyromegaly  LUNGS: clear to auscultation, no RRW  CARDIO: RRR without murmur  EXTREMITIES: no cyanosis, clubbing or edema  GI:soft Nontender Normoactive BS.  No palpable masses no guarding rigidity no rebound tenderness    No results found for this or any previous visit (from the past 4 weeks).        ASSESSMENT AND PLAN:         1. Essential hypertension  - amLODIPine 5 MG Oral Tab; Take 1 tablet (5 mg total) by mouth daily.  Dispense: 90 tablet; Refill: 1  - lisinopril 20 MG Oral Tab; Take 1 tablet (20 mg total) by mouth daily.  Dispense: 90 tablet; Refill: 1    2. History of gout  - Uric Acid; Future    3. Hypercholesterolemia    Blood pressure slightly elevated today in office.  Will add amlodipine okay to continue with lisinopril 20 mg.  Okay to discontinue hydrochlorothiazide in light of recent gout flareup.  Will check uric acid.  To follow-up in 6 months if BP remains well-controlled    PATIENT INSTRUCTIONS:    Continue with Lisinopril 20 mg  Start Amlodipine 5 mg once a day  Monitor blood pressure once a day for the next 2 weeks. If stable ok to check 1x a week  Follow up in 6 months, April 2025 for medicare annual wellness    FOLLOW UP: 6 months                 [1]    Allergies  Allergen Reactions    Seasonal Runny nose     Sneezing, runny nose    [2]   Current Outpatient Medications on File Prior to Visit   Medication Sig Dispense Refill    ATORVASTATIN 20 MG Oral Tab TAKE 1 TABLET EVERY NIGHT 90 tablet 3    Sildenafil Citrate 50 MG Oral Tab Take 1 tablet (50 mg total) by mouth daily as needed for Erectile Dysfunction. 18 tablet 3     No current facility-administered medications on file prior to visit.

## 2024-10-10 NOTE — PATIENT INSTRUCTIONS
Thank you for choosing [facility]  To Do:  FOR MARCIN KEENAN    Continue with Lisinopril 20 mg  Start Amlodipine 5 mg once a day  Monitor blood pressure once a day for the next 2 weeks. If stable ok to check 1x a week  Follow up in 6 months, April 2025 for medicare annual wellness

## 2024-10-15 ENCOUNTER — PATIENT OUTREACH (OUTPATIENT)
Dept: CASE MANAGEMENT | Age: 74
End: 2024-10-15

## 2024-10-15 NOTE — PROGRESS NOTES
MARK Follow-up Assessment    General:  Assessment completed with: Patient  Community Resources: Other (N/A)    Progress/Care Plan:  Is the patient progressing as planned?: Yes  Care Plan Update: Spoke with patient via Romansh interpretor.  Patient reports his right foot pain has resolved. He seen his primary care physician and amlodipine was added to his medication regimen as his blood pressure was not at goal.  Patient has begun taking both amlodipine and lisinopril.  Patient does not have a blood pressure monitor at home but does have a nurse visit scheduled for 10/24/24 for recheck of his blood pressure. He also plans to have his uric acid lab done after that appointment. Patient did not have any additional questions for nurse care manager.  New Care Plan: complete [facility] appt next week for bp recheck and also complete uric acid lab  Frequency/Follow Up Plan: 1 week call     Notes:  Navigator Notes: [facility] appt kept and lab drawn?

## 2024-10-22 ENCOUNTER — PATIENT OUTREACH (OUTPATIENT)
Dept: CASE MANAGEMENT | Age: 74
End: 2024-10-22

## 2024-10-22 NOTE — PROGRESS NOTES
Left message on mailbox for patient to call nurse care manager back for transitions of care call.  Nurse care  information included in message.  Via Armenian interpretor.     MARK Follow-up Assessment    General:  Assessment completed with: Patient  Community Resources: Other (N/A)    Progress/Care Plan:  Is the patient progressing as planned?: Yes  Care Plan Update: Spoke with patient via Armenian interpretor.  Patient is doing well and foot pain continues to be resolved.   He is not experiencing any side effects from  amlodipine.  Reminded him of his appointment on 10/24 with nurse for blood pressure check and also to have lab done after. Patient did not have any additional questions or concerns.  New Care Plan: go to [facility] and get lab drawn  Frequency/Follow Up Plan: 1 week     Notes:  Navigator Notes: BP @ [facility] and lab?

## 2024-10-24 ENCOUNTER — NURSE ONLY (OUTPATIENT)
Dept: FAMILY MEDICINE CLINIC | Facility: CLINIC | Age: 74
End: 2024-10-24
Payer: MEDICARE

## 2024-10-24 VITALS — SYSTOLIC BLOOD PRESSURE: 110 MMHG | DIASTOLIC BLOOD PRESSURE: 60 MMHG

## 2024-10-29 ENCOUNTER — PATIENT OUTREACH (OUTPATIENT)
Dept: CASE MANAGEMENT | Age: 74
End: 2024-10-29

## 2024-10-29 NOTE — PROGRESS NOTES
Patient had nurse visit on 10/24 and blood pressure was 110/60. Patient did not have uric acid level drawn.        MARK Follow-up Assessment    General:  Assessment completed with: Patient  Community Resources: Other (N/A)    Progress/Care Plan:  Is the patient progressing as planned?: Yes  Care Plan Update: Spoke with patient via New Zealander interpretor.  Patient informed blood pressure was normal when he had it checked @ primary care physician office on 10/24.  Patient reports foot pain is still not present.  Patient reminded to complete uric acid lab at any [facility] facility and no fasting is required.  Patient did not have any additional questions or concerns.  New Care Plan: Complete uric acid lab  Frequency/Follow Up Plan: 1 week     Notes:  Navigator Notes: uric acid lab completed?

## 2024-10-31 DIAGNOSIS — N52.9 ERECTILE DYSFUNCTION, UNSPECIFIED ERECTILE DYSFUNCTION TYPE: ICD-10-CM

## 2024-10-31 RX ORDER — SILDENAFIL 50 MG/1
50 TABLET, FILM COATED ORAL DAILY PRN
Qty: 18 TABLET | Refills: 3 | Status: SHIPPED | OUTPATIENT
Start: 2024-10-31

## 2024-11-04 ENCOUNTER — PATIENT OUTREACH (OUTPATIENT)
Dept: CASE MANAGEMENT | Age: 74
End: 2024-11-04

## 2024-11-04 NOTE — PROGRESS NOTES
Left message on mailbox for patient to call nurse care manager back for transitions of care call.  Nurse care  information included in message.

## 2024-11-06 NOTE — PROGRESS NOTES
Dear Roxie Matta MD,     My name is Demi Singletary RN and I am this patient's Transition of Care Navigator.      Thank you for allowing me to participate in your patient's care over the past 30 days.      The goal of this program is to assist individuals in meeting their health care needs post discharge.  This is done by providing consistent care coordination and connecting patients with needed resources throughout this episode of care. I'd like to inform you that your patient Glen Morales has completed the 30 day MARK navigation program as of today.      In discussion with the patient/family contact, I reviewed their providers and ongoing support contacts for the future.      Areas of need: needs to complete uric acid lab  Areas of progress: gout resolved and blood pressure controlled     It has been a pleasure communicating and working with you and thank you for allowing me to participate in the care of your patient.      If for any reason you have questions about the program, please feel free to contact me at the information below.        Sincerely,     Demi Singletary RN        MARK Graduation Assessment    General:  Assessment completed with: Patient  Community Resources: Other (n/a)    Care Plan/Instructions:   Care Plan Summary (Recap of navigation period including # of ED & Hospital Admission, and if goals met or unmet): Spoke with patient with Vincentian interpretor. His foot pain continues to be resolved and blood pressure is normal.  Reminded patient to complete uric acid lab.  Patient informed this was nurse care manager last outreach call. Navigation period 10/7 - 11/4.  One emergency room visit on 10/5 that initiated MARK.  Goals have been met.  Patient Graduation Instructions (Ongoing barriers to care identified, Areas of Need, Areas of Progress): Barriers:  None  Needs:  complete uric acid lab  Progress:  gout resolved, blood pressure controlled

## 2025-01-29 ENCOUNTER — OFFICE VISIT (OUTPATIENT)
Dept: FAMILY MEDICINE CLINIC | Facility: CLINIC | Age: 75
End: 2025-01-29
Payer: MEDICARE

## 2025-01-29 VITALS
DIASTOLIC BLOOD PRESSURE: 80 MMHG | TEMPERATURE: 98 F | BODY MASS INDEX: 29 KG/M2 | WEIGHT: 154 LBS | SYSTOLIC BLOOD PRESSURE: 132 MMHG | OXYGEN SATURATION: 96 % | RESPIRATION RATE: 20 BRPM | HEART RATE: 87 BPM

## 2025-01-29 DIAGNOSIS — M10.9 ACUTE GOUT OF LEFT ANKLE, UNSPECIFIED CAUSE: Primary | ICD-10-CM

## 2025-01-29 PROCEDURE — 3079F DIAST BP 80-89 MM HG: CPT | Performed by: NURSE PRACTITIONER

## 2025-01-29 PROCEDURE — 1160F RVW MEDS BY RX/DR IN RCRD: CPT | Performed by: NURSE PRACTITIONER

## 2025-01-29 PROCEDURE — 1159F MED LIST DOCD IN RCRD: CPT | Performed by: NURSE PRACTITIONER

## 2025-01-29 PROCEDURE — 3075F SYST BP GE 130 - 139MM HG: CPT | Performed by: NURSE PRACTITIONER

## 2025-01-29 PROCEDURE — 99213 OFFICE O/P EST LOW 20 MIN: CPT | Performed by: NURSE PRACTITIONER

## 2025-01-29 RX ORDER — METHYLPREDNISOLONE 4 MG/1
TABLET ORAL
Qty: 1 EACH | Refills: 0 | Status: SHIPPED | OUTPATIENT
Start: 2025-01-29

## 2025-01-29 NOTE — PROGRESS NOTES
CHIEF COMPLAINT:     Chief Complaint   Patient presents with    Foot Swelling     Left foot pain/swelling at top of foot  for 3 days.       HPI:   Glen Morales is a 74 year old male  who presents with complaints of \"gout.\" Left ankle has been hurting for 3 days.  Patient  has history of gout in the past in the foot and ankle. Reports sxs typically improve with medrol dosepack    Pain does not radiate. No weakness.  No numbness or tingling. No known injury. Treatments tried include: ibuprofen.    Reports he ate red meat twice last week, is unsure if this may have caused the flare    OUTPATIENT MEDICATIONS:      methylPREDNISolone 4 MG Oral Tablet Therapy Pack Take as directed on package 1 each 0    SILDENAFIL CITRATE 50 MG Oral Tab TAKE 1 TABLET DAILY AS NEEDED FOR ERECTILE DYSFUNCTION. 18 tablet 3    amLODIPine 5 MG Oral Tab Take 1 tablet (5 mg total) by mouth daily. 90 tablet 1    lisinopril 20 MG Oral Tab Take 1 tablet (20 mg total) by mouth daily. 90 tablet 1    ATORVASTATIN 20 MG Oral Tab TAKE 1 TABLET EVERY NIGHT 90 tablet 3          PAST MEDICAL HISTORY:     Past Medical History:    Essential hypertension    Gout    Hyperlipidemia       REVIEW OF SYSTEMS:   GENERAL: Denies fever, chills, decreased appetite  SKIN: Denies rashes, skin wounds or ulcers.  CHEST: Denies chest pain, or palpitations  LUNGS: Denies shortness of breath, cough, or wheezing  MUSCULOSKELETAL: no other arthralgia or swollen joints  NEURO: Denies loss of sensation, numbness or tingling.     EXAM:   /80   Pulse 87   Temp 97.9 °F (36.6 °C) (Oral)   Resp 20   Wt 154 lb (69.9 kg)   SpO2 96%   BMI 29.10 kg/m²   GENERAL: well developed, well nourished,in no apparent distress  SKIN: no rashes,no suspicious lesions  NECK: supple, non-tender  LUNGS: clear to auscultation bilaterally, no wheezes or rhonchi. Breathing is non labored.  CARDIO: Regular rate, regular rhythm without murmur.  LOWER EXTREMITIES: No erythema or edema noted to  left foot or ankle. + tenderness to palpation of left lateral ankle. Admits pain with rotation of ankle but maintain full ROM Dorsalis pedis and posterior tibialis pulses intact and equal bilaterally.  Sensation intact. Cap refill brisk.       ASSESSMENT AND PLAN:     ASSESSMENT:  Encounter Diagnosis   Name Primary?    Acute gout of left ankle, unspecified cause Yes         PLAN:  Will treat with anti inflammatory as listed below.  Discussed maintenance medications, but patient needs to see PCP for this.  Stressed importance of proper follow up if no improvement Instructions as listed below.    Patient Instructions   Medication as prescribed. Stop ibuprofen  Follow up with your primary care doctor if not improving the next few days  Seek urgent follow up for new/ worsening symptoms        The patient indicates understanding of these issues and agrees to the plan.  The patient is asked to see her PCP if no improvement in pain in 3 days.

## 2025-01-29 NOTE — PATIENT INSTRUCTIONS
Medication as prescribed. Stop ibuprofen  Follow up with your primary care doctor if not improving the next few days  Seek urgent follow up for new/ worsening symptoms

## 2025-02-08 ENCOUNTER — OFFICE VISIT (OUTPATIENT)
Dept: FAMILY MEDICINE CLINIC | Facility: CLINIC | Age: 75
End: 2025-02-08
Payer: MEDICARE

## 2025-02-08 VITALS
SYSTOLIC BLOOD PRESSURE: 120 MMHG | HEART RATE: 77 BPM | WEIGHT: 154 LBS | OXYGEN SATURATION: 99 % | TEMPERATURE: 98 F | BODY MASS INDEX: 29 KG/M2 | DIASTOLIC BLOOD PRESSURE: 70 MMHG | RESPIRATION RATE: 20 BRPM

## 2025-02-08 DIAGNOSIS — M10.9 ACUTE GOUT OF LEFT ANKLE, UNSPECIFIED CAUSE: Primary | ICD-10-CM

## 2025-02-08 PROCEDURE — 99213 OFFICE O/P EST LOW 20 MIN: CPT | Performed by: NURSE PRACTITIONER

## 2025-02-08 PROCEDURE — 1160F RVW MEDS BY RX/DR IN RCRD: CPT | Performed by: NURSE PRACTITIONER

## 2025-02-08 PROCEDURE — 1159F MED LIST DOCD IN RCRD: CPT | Performed by: NURSE PRACTITIONER

## 2025-02-08 PROCEDURE — 3074F SYST BP LT 130 MM HG: CPT | Performed by: NURSE PRACTITIONER

## 2025-02-08 PROCEDURE — 3078F DIAST BP <80 MM HG: CPT | Performed by: NURSE PRACTITIONER

## 2025-02-08 RX ORDER — COLCHICINE 0.6 MG/1
0.6 TABLET ORAL DAILY
Qty: 14 TABLET | Refills: 0 | Status: SHIPPED | OUTPATIENT
Start: 2025-02-08

## 2025-02-08 NOTE — PROGRESS NOTES
Chief Complaint   Patient presents with    Foot Pain     Possible gout at left foot for 3 days.          HPI:   Glen Morales is a 74 year old male present with symptoms of left ankle pain after being seen at the Walk-In about 8 days ago and treated with Medrol Dose Pack. Once completing steroid, pain returned without swelling or redness. Patient did take Ibuprofen last night for the pain. He denies recent injury. painful to touch. No radiation. 5/10.  No weakness.  No numbness or tingling. Worsening.  Decrease weight bearing, no fever, chills, fatigue, malaise.  Prior history: Recurrent  Prior treatment: as above    Current Outpatient Medications   Medication Sig Dispense Refill    colchicine 0.6 MG Oral Tab Take 1 tablet (0.6 mg total) by mouth daily. Take another tablet 1 hour after initial dose today only, if needed. 14 tablet 0    methylPREDNISolone 4 MG Oral Tablet Therapy Pack Take as directed on package 1 each 0    SILDENAFIL CITRATE 50 MG Oral Tab TAKE 1 TABLET DAILY AS NEEDED FOR ERECTILE DYSFUNCTION. 18 tablet 3    amLODIPine 5 MG Oral Tab Take 1 tablet (5 mg total) by mouth daily. 90 tablet 1    lisinopril 20 MG Oral Tab Take 1 tablet (20 mg total) by mouth daily. 90 tablet 1    ATORVASTATIN 20 MG Oral Tab TAKE 1 TABLET EVERY NIGHT 90 tablet 3      Past Medical History:    Essential hypertension    Gout    Hyperlipidemia      No past surgical history on file.   Family History   Problem Relation Age of Onset    Diabetes Mother     Diabetes Sister       Social History     Socioeconomic History    Marital status:    Tobacco Use    Smoking status: Never    Smokeless tobacco: Never   Vaping Use    Vaping status: Never Used   Substance and Sexual Activity    Alcohol use: Yes    Drug use: No     Social Drivers of Health     Transportation Needs: No Transportation Needs (10/8/2024)    Transportation Needs     Lack of Transportation: No         REVIEW OF SYSTEMS:   GEN: no fever, no chills, no  fatigue  CHEST: no chest pains.  SKIN: no rashes  HEM: no ecchymoses  JOINTS: see HPI  NEURO: no tingling, no weakness, no abnormal sensation.    EXAM:   /70   Pulse 77   Temp 97.7 °F (36.5 °C) (Oral)   Resp 20   Wt 154 lb (69.9 kg)   SpO2 99%   BMI 29.10 kg/m²   Gen:  WD/WN NAD  HEENT:  PEERLA, EOM-i.  LUNGS:CTA saida.  HEART:S1/S2 reg., no murmurs, clicks, gallops  SKIN:no rashes on the chest or back.  Left Ankle: Normal on inspection, no pain on palpation. No swelling.   Neuro:  Reflexes at knees 2+ and symmetric    ASSESSMENT AND PLAN:   Glen Morales is a 74 year old male present with:    Encounter Diagnosis   Name Primary?    Acute gout of left ankle, unspecified cause Yes       Meds & Refills for this Visit:  Requested Prescriptions     Signed Prescriptions Disp Refills    colchicine 0.6 MG Oral Tab 14 tablet 0     Sig: Take 1 tablet (0.6 mg total) by mouth daily. Take another tablet 1 hour after initial dose today only, if needed.       Imaging & Consults:  None    Be sure to take medicine as prescribed.    If you are prescribed a Nonsteroidal Antiinflammatory drug (NSAID) be sure to take it with food.  Gout flares can be triggered by the types of food you eat.    Cut down on or avoid:  Red meat and seafood   Beer and hard alcohol, such as gin or vodka   Foods and drinks that have high-fructose corn syrup (that includes most sodas, and store-bought cakes and cookies)   Food listed below are good to help decrease flare up of gout.   Low-fat dairy products, such as low-fat milk, cheese, and yogurt   Whole grains and vegetables  Cherries daily or Cherry juice concentrate    Must follow up with PCP within 7 days for complete work up for recurrent flares.   Discussed maintenance therapy.

## 2025-02-10 ENCOUNTER — LAB ENCOUNTER (OUTPATIENT)
Dept: LAB | Age: 75
End: 2025-02-10
Attending: EMERGENCY MEDICINE
Payer: MEDICARE

## 2025-02-10 ENCOUNTER — OFFICE VISIT (OUTPATIENT)
Dept: FAMILY MEDICINE CLINIC | Facility: CLINIC | Age: 75
End: 2025-02-10
Payer: MEDICARE

## 2025-02-10 VITALS
HEART RATE: 80 BPM | WEIGHT: 158 LBS | RESPIRATION RATE: 21 BRPM | BODY MASS INDEX: 29.83 KG/M2 | OXYGEN SATURATION: 98 % | SYSTOLIC BLOOD PRESSURE: 120 MMHG | DIASTOLIC BLOOD PRESSURE: 60 MMHG | HEIGHT: 61 IN

## 2025-02-10 DIAGNOSIS — M10.9 ACUTE GOUT OF LEFT ANKLE, UNSPECIFIED CAUSE: Primary | ICD-10-CM

## 2025-02-10 DIAGNOSIS — Z87.39 HISTORY OF GOUT: ICD-10-CM

## 2025-02-10 LAB — URATE SERPL-MCNC: 8.2 MG/DL

## 2025-02-10 PROCEDURE — 3078F DIAST BP <80 MM HG: CPT | Performed by: EMERGENCY MEDICINE

## 2025-02-10 PROCEDURE — 36415 COLL VENOUS BLD VENIPUNCTURE: CPT

## 2025-02-10 PROCEDURE — G2211 COMPLEX E/M VISIT ADD ON: HCPCS | Performed by: EMERGENCY MEDICINE

## 2025-02-10 PROCEDURE — 99214 OFFICE O/P EST MOD 30 MIN: CPT | Performed by: EMERGENCY MEDICINE

## 2025-02-10 PROCEDURE — 3008F BODY MASS INDEX DOCD: CPT | Performed by: EMERGENCY MEDICINE

## 2025-02-10 PROCEDURE — 3074F SYST BP LT 130 MM HG: CPT | Performed by: EMERGENCY MEDICINE

## 2025-02-10 PROCEDURE — 1159F MED LIST DOCD IN RCRD: CPT | Performed by: EMERGENCY MEDICINE

## 2025-02-10 PROCEDURE — 84550 ASSAY OF BLOOD/URIC ACID: CPT

## 2025-02-10 RX ORDER — ALLOPURINOL 100 MG/1
100 TABLET ORAL DAILY
Qty: 90 TABLET | Refills: 1 | Status: SHIPPED | OUTPATIENT
Start: 2025-02-10

## 2025-02-10 RX ORDER — PREDNISONE 10 MG/1
TABLET ORAL
Qty: 30 TABLET | Refills: 0 | Status: SHIPPED | OUTPATIENT
Start: 2025-02-10 | End: 2025-02-22

## 2025-02-10 NOTE — PATIENT INSTRUCTIONS
Thank you for choosing [facility]  To Do:  FOR MARCIN KEENAN    Have blood test done today  Stop colchicine for now  Start tapering steroids, prednisone x 12 days  After steroids start Allopurinol 100 mg once a day  Follow up in April for annual wellness  Low protein diet

## 2025-02-10 NOTE — PROGRESS NOTES
Chief Complaint:   Chief Complaint   Patient presents with    Follow - Up     [facility] 2/8 - gout left foot      HPI:   This is a 74 year old male         GOUT    Patient here for follow-up regarding gout.  Had developed pain in his left foot and ankle approximately 3 weeks ago was treated with Medrol Dosepak.  2 weeks later.  Patient experienced a recurrence of symptoms and was seen again at walk-in clinic where he was treated with colchicine.  Today reports improved symptoms.  Overall feels better but left foot still feels sore.  Still has trouble bearing weight on left foot no history of any injury no fever.  In the past patient had been diagnosed with gout when he had similar symptoms.        [facility]       Past Medical History:    Essential hypertension    Gout    Hyperlipidemia     History reviewed. No pertinent surgical history.  Social History:  Social History     Socioeconomic History    Marital status:    Tobacco Use    Smoking status: Never    Smokeless tobacco: Never   Vaping Use    Vaping status: Never Used   Substance and Sexual Activity    Alcohol use: Yes    Drug use: No     Social Drivers of Health     Transportation Needs: No Transportation Needs (10/8/2024)    Transportation Needs     Lack of Transportation: No     Family History:  Family History   Problem Relation Age of Onset    Diabetes Mother     Diabetes Sister      Allergies:  Allergies[1]  Current Meds:  Medications Ordered Prior to Encounter[2]   Counseling given: Not Answered         PROBLEM LIST     Patient Active Problem List   Diagnosis    Essential hypertension    Hypercholesterolemia    CKD (chronic kidney disease) stage 3, GFR 30-59 ml/min ([facility])    Erectile dysfunction    Calcification of coronary artery         REVIEW OF SYSTEMS:   Review of systems significant for left foot.  Pain  The rest of the review of systems is negative except those stated as above    PHYSICAL EXAM:   /60   Pulse 80   Resp 21   Ht 5' 1\" (1.549 m)    Wt 158 lb (71.7 kg)   SpO2 98%   BMI 29.85 kg/m²  Estimated body mass index is 29.85 kg/m² as calculated from the following:    Height as of this encounter: 5' 1\" (1.549 m).    Weight as of this encounter: 158 lb (71.7 kg).   Vital signs reviewed.Appears stated age, well groomed.  GENERAL: well developed, well nourished, well hydrated, no distress  SKIN: good skin turgor, no obvious rashes  HEENT: atraumatic, normocephalic, ears, nose and throat are clear  EYES: sclera non icteric bilateral  NECK: supple, no adenopathy, no thyromegaly  LUNGS: clear to auscultation, no RRW  CARDIO: RRR without murmur  EXTREMITIES: no cyanosis, clubbing or edema, left foot with some trace swelling mild diffuse tenderness to the dorsum and left lateral malleolus.  No bony tenderness no deformity.      ASSESSMENT AND PLAN:         1. Acute gout of left ankle, unspecified cause  - predniSONE 10 MG Oral Tab; Take 4 tablets (40 mg total) by mouth daily for 3 days, THEN 3 tablets (30 mg total) daily for 3 days, THEN 2 tablets (20 mg total) daily for 3 days, THEN 1 tablet (10 mg total) daily for 3 days.  Dispense: 30 tablet; Refill: 0  - allopurinol 100 MG Oral Tab; Take 1 tablet (100 mg total) by mouth daily.  Dispense: 90 tablet; Refill: 1    Notes from walk-in clinic reviewed previous office visits also reviewed.  Recent labs also reviewed    PATIENT INSTRUCTIONS:    Have blood test done today  Stop colchicine for now  Start tapering steroids, prednisone x 12 days  After steroids start Allopurinol 100 mg once a day  Follow up in April for annual wellness  Low protein diet  FOLLOW UP: April 2025               [1]   Allergies  Allergen Reactions    Seasonal Runny nose     Sneezing, runny nose    [2]   Current Outpatient Medications on File Prior to Visit   Medication Sig Dispense Refill    colchicine 0.6 MG Oral Tab Take 1 tablet (0.6 mg total) by mouth daily. Take another tablet 1 hour after initial dose today only, if needed. 14  tablet 0    SILDENAFIL CITRATE 50 MG Oral Tab TAKE 1 TABLET DAILY AS NEEDED FOR ERECTILE DYSFUNCTION. 18 tablet 3    amLODIPine 5 MG Oral Tab Take 1 tablet (5 mg total) by mouth daily. 90 tablet 1    lisinopril 20 MG Oral Tab Take 1 tablet (20 mg total) by mouth daily. 90 tablet 1    ATORVASTATIN 20 MG Oral Tab TAKE 1 TABLET EVERY NIGHT 90 tablet 3     No current facility-administered medications on file prior to visit.

## 2025-02-11 DIAGNOSIS — Z87.39 HISTORY OF GOUT: Primary | ICD-10-CM

## 2025-04-10 ENCOUNTER — OFFICE VISIT (OUTPATIENT)
Dept: FAMILY MEDICINE CLINIC | Facility: CLINIC | Age: 75
End: 2025-04-10
Payer: MEDICARE

## 2025-04-10 VITALS
WEIGHT: 149 LBS | RESPIRATION RATE: 19 BRPM | HEART RATE: 79 BPM | HEIGHT: 61 IN | DIASTOLIC BLOOD PRESSURE: 60 MMHG | SYSTOLIC BLOOD PRESSURE: 110 MMHG | BODY MASS INDEX: 28.13 KG/M2 | OXYGEN SATURATION: 97 %

## 2025-04-10 DIAGNOSIS — Z12.5 SCREENING FOR PROSTATE CANCER: ICD-10-CM

## 2025-04-10 DIAGNOSIS — Z00.00 ENCOUNTER FOR ANNUAL PHYSICAL EXAM: Primary | ICD-10-CM

## 2025-04-10 DIAGNOSIS — I10 ESSENTIAL HYPERTENSION: ICD-10-CM

## 2025-04-10 DIAGNOSIS — E78.00 HYPERCHOLESTEROLEMIA: ICD-10-CM

## 2025-04-10 DIAGNOSIS — E79.0 HYPERURICEMIA: ICD-10-CM

## 2025-04-10 RX ORDER — LISINOPRIL 20 MG/1
20 TABLET ORAL DAILY
Qty: 90 TABLET | Refills: 1 | Status: SHIPPED | OUTPATIENT
Start: 2025-04-10

## 2025-04-10 RX ORDER — ATORVASTATIN CALCIUM 20 MG/1
20 TABLET, FILM COATED ORAL NIGHTLY
Qty: 90 TABLET | Refills: 3 | Status: SHIPPED | OUTPATIENT
Start: 2025-04-10

## 2025-04-10 NOTE — PROGRESS NOTES
Subjective:   Glen Morales is a 74 year old male who presents for a MA AHA (Medicare Advantage Annual Health Assessment) and scheduled follow up of multiple significant but stable problems.   History of Present Illness  Glen Morales is a 74 year old male with hypertension and hyperlipidemia who presents for a follow-up visit.    He is currently on lisinopril and amlodipine for hypertension management, discontinued hydrochlorothiazide bec of gout   No new symptoms related to hypertension are reported, and he feels generally well.    For hyperlipidemia, he is taking atorvastatin and requires a refill. No new symptoms related to hyperlipidemia are reported.    He has a history of gout and is taking allopurinol 100 mg daily. He completed a course of colchicine in February for an acute gout flare. His uric acid level was 8.2 in February. He has been adhering to dietary modifications to manage his uric acid levels and finds these changes beneficial.    He mentions a weight gain of about three pounds, currently weighing 149 pounds, which is slightly above his preferred weight of 145 pounds. He attributes this to some of his clothes not fitting as well as before.    He has completed the shingles vaccination series and does not routinely receive the flu vaccine, as he does not feel it is necessary.    He plans to travel to [facility] in the summer, where he typically stays for three to four weeks.        Wt Readings from Last 6 Encounters:   04/10/25 149 lb (67.6 kg)   02/10/25 158 lb (71.7 kg)   02/08/25 154 lb (69.9 kg)   01/29/25 154 lb (69.9 kg)   10/10/24 145 lb (65.8 kg)   10/05/24 144 lb (65.3 kg)                     History/Other:   Fall Risk Assessment:   He has been screened for Falls and is High Risk. Fall Prevention information provided to patient in After Visit Summary.    Do you feel unsteady when standing or walking?: No  Do you worry about falling?: Yes  Have you fallen in the past year?: No      Cognitive Assessment:   He had a completely normal cognitive assessment - see flowsheet entries     Functional Ability/Status:   Glen Morales has a completely normal functional assessment. See flowsheet for details.      Depression Screening (PHQ):  PHQ-2 SCORE: 0  , done 4/10/2025            Advanced Directives:   He does NOT have a Living Will. [Do you have a living will?: No]  He does NOT have a Power of  for Health Care. [Do you have a healthcare power of ?: No]   Discussed with patient        Problem List[1]  Allergies:  He is allergic to seasonal.    Current Medications:  Active Meds, Sig Only[2]    Medical History:  He  has a past medical history of Essential hypertension, Gout, and Hyperlipidemia.  Surgical History:  He  has no past surgical history on file.   Family History:  His family history includes Diabetes in his mother and sister.  Social History:  He  reports that he has never smoked. He has never used smokeless tobacco. He reports current alcohol use. He reports that he does not use drugs.    Tobacco:  He has never smoked tobacco.    CAGE Alcohol Screen:   He has been screened for alcohol abuse and his score is not 0:  Cut: Have you ever felt you should Cut down on your drinking?: Yes  Annoyed: Have people Annoyed you by criticizing your drinking?: No  Guilty: Have you ever felt bad or Guilty about your drinking?: No  Eye Opener: Have you ever had a drink first thing in the morning to steady your nerves or to get rid of a hangover (Eye opener)?: No  Total Score: 1      Patient Care Team:  Roxie Matta MD as PCP - General (Family Medicine)  Demi Singletary RN as [facility] Care Manager      Objective:   Physical Exam  General Appearance:  Alert, cooperative, no distress, appears stated age   Head:  Normocephalic, without obvious abnormality, atraumatic   Eyes:  PERRL, conjunctiva/corneas clear, EOM's intact, both eyes   Ears:  Normal TM's and external ear canals, both ears    Nose: Nares normal, septum midline, mucosa normal, no drainage or sinus tenderness   Throat: Lips, mucosa, and tongue normal; teeth and gums normal   Neck: Supple, symmetrical, trachea midline, no adenopathy, thyroid: not enlarged, symmetric, no tenderness/mass/nodules, no carotid bruit or JVD   Back:   Symmetric, no curvature, ROM normal, no CVA tenderness   Lungs:   Clear to auscultation bilaterally, respirations unlabored   Chest Wall:  No tenderness or deformity   Heart:  Regular rate and rhythm, S1, S2 normal, no murmur, rub or gallop   Abdomen:   Soft, non-tender, bowel sounds active all four quadrants,  no masses, no organomegaly   Extremities: Extremities normal, atraumatic, no cyanosis or edema   Pulses: 2+ and symmetric   Skin: Skin color, texture, turgor normal, no rashes or lesions   Lymph nodes: Cervical, supraclavicular, and axillary nodes normal   Neurologic: Normal     /60   Pulse 79   Resp 19   Ht 5' 1\" (1.549 m)   Wt 149 lb (67.6 kg)   SpO2 97%   BMI 28.15 kg/m²  Estimated body mass index is 28.15 kg/m² as calculated from the following:    Height as of this encounter: 5' 1\" (1.549 m).    Weight as of this encounter: 149 lb (67.6 kg).      Medicare Hearing Assessment:   Hearing Screening    Time taken: 4/10/2025  1:41 PM  Screening Method: Whisper Test  Whisper Test Result: Pass               Assessment & Plan:   Glen Morales is a 74 year old male who presents for a Medicare Assessment.     1. Encounter for annual physical exam (Primary)  -     CBC With Differential With Platelet; Future; Expected date: 04/10/2025  -     Comp Metabolic Panel (14); Future; Expected date: 04/10/2025  -     Lipid Panel; Future; Expected date: 04/10/2025  -     TSH W Reflex To Free T4; Future; Expected date: 04/10/2025  2. Screening for prostate cancer  -     PSA Total, Screen; Future; Expected date: 04/10/2025  3. Hyperuricemia  -     Uric Acid; Future; Expected date: 04/10/2025  4. Essential  hypertension  -     Comp Metabolic Panel (14); Future; Expected date: 04/10/2025  -     Lipid Panel; Future; Expected date: 04/10/2025  -     TSH W Reflex To Free T4; Future; Expected date: 04/10/2025  -     Lisinopril; Take 1 tablet (20 mg total) by mouth daily.  Dispense: 90 tablet; Refill: 1  5. Hypercholesterolemia  -     Comp Metabolic Panel (14); Future; Expected date: 04/10/2025  -     Lipid Panel; Future; Expected date: 04/10/2025  -     Atorvastatin Calcium; Take 1 tablet (20 mg total) by mouth nightly.  Dispense: 90 tablet; Refill: 3    Assessment & Plan  Gout  Uric acid level 8.2 mg/dL, above target <6 mg/dL. On allopurinol 100 mg daily since February. Following dietary modifications.  - Continue allopurinol 100 mg daily.  - Check uric acid level.  - Increase allopurinol to 200 mg daily if uric acid level >6 mg/dL.  - Reinforce dietary modifications.    Hypertension  Managed with lisinopril and amlodipine. Advised weekly blood pressure monitoring.  - Continue lisinopril.  - Continue amlodipine.  - Monitor blood pressure weekly or as needed.    Hyperlipidemia  Managed with atorvastatin. Refill requested.  - Continue atorvastatin.  - Refill atorvastatin prescription.    Erectile Dysfunction  Uses sildenafil as needed. Supply sufficient.  - Continue sildenafil as needed.    General Health Maintenance  Received shingles vaccine. Declines annual flu vaccine, though recommended.  - Recommend annual flu vaccine.    Follow-up  Scheduled in six months for follow-up and fasting blood test for uric acid and other parameters.  - Follow up in six months.  - Return for fasting blood test.  The patient indicates understanding of these issues and agrees to the plan.  Reinforced healthy diet, lifestyle, and exercise.      No follow-ups on file.     Roxie Matta MD, 4/10/2025     Supplementary Documentation:   General Health:  In the past six months, have you lost more than 10 pounds without trying?: 2 - No  Has your  appetite been poor?: No  Type of Diet: Balanced  How does the patient maintain a good energy level?: Appropriate Exercise  How would you describe your daily physical activity?: Moderate  How would you describe your current health state?: Good  How do you maintain positive mental well-being?: Visiting Family  On a scale of 0 to 10, with 0 being no pain and 10 being severe pain, what is your pain level?: 0 - (None)  In the past six months, have you experienced urine leakage?: 0-No  At any time do you feel concerned for the safety/well-being of yourself and/or your children, in your home or elsewhere?: Yes  Have you had any immunizations at another office such as Influenza, Hepatitis B, Tetanus, or Pneumococcal?: No    Health Maintenance   Topic Date Due    Zoster Vaccines (2 of 2) 01/09/2023    Colorectal Cancer Screening  05/28/2024    COVID-19 Vaccine (4 - 2024-25 season) 09/01/2024    Annual Well Visit  01/01/2025    Annual Depression Screening  01/01/2025    Fall Risk Screening (Annual)  01/01/2025    Influenza Vaccine (Season Ended) 10/01/2025    Pneumococcal Vaccine: 50+ Years  Completed    Meningococcal B Vaccine  Aged Out            [1]   Patient Active Problem List  Diagnosis    Essential hypertension    Hypercholesterolemia    CKD (chronic kidney disease) stage 3, GFR 30-59 ml/min ([facility])    Erectile dysfunction    Calcification of coronary artery   [2]   Outpatient Medications Marked as Taking for the 4/10/25 encounter (Office Visit) with Roxie Matta MD   Medication Sig    lisinopril 20 MG Oral Tab Take 1 tablet (20 mg total) by mouth daily.    atorvastatin 20 MG Oral Tab Take 1 tablet (20 mg total) by mouth nightly.    allopurinol 100 MG Oral Tab Take 1 tablet (100 mg total) by mouth daily.    SILDENAFIL CITRATE 50 MG Oral Tab TAKE 1 TABLET DAILY AS NEEDED FOR ERECTILE DYSFUNCTION.    amLODIPine 5 MG Oral Tab Take 1 tablet (5 mg total) by mouth daily.

## 2025-04-10 NOTE — PROGRESS NOTES
The following individual(s) verbally consented to be recorded using ambient AI listening technology and understand that they can each withdraw their consent to this listening technology at any point by asking the clinician to turn off or pause the recording:    Patient name: Glen Marreroa   No

## 2025-04-10 NOTE — PATIENT INSTRUCTIONS
Thank you for choosing [facility]  To Do:  FOR MARCIN KEENAN    Have blood test done  Continue with all medications  Goal for uric acid level < 6.0, continue with Allopurinol  Check blood pressure once a week or as needed  Follow up in 6 months  Flu shot in the fall        Well balanced diet recommended.    Routine exercise recommended most days during the week.  Wear sunscreen - SPF 15 or higher and reapply every 2 hours as needed.  Wear seat belts and drive safely.  Schedule regular appointments with dentist.  Schedule yearly eye exam if you wear glasses/contacts.  Yearly Flu Vaccine recommended.  Tetanus, Diptheria and Pertussis vaccine should be given every 7-10 years.  Call or come in if there are concerns regarding domestic abuse, sexually transmitted diseases, alcohol/drug addiction, depression/anxiety issues, or any further concerns.    Prevention Guidelines, Men Ages 65 and Older  Screening tests and vaccines are an important part of managing your health. Health counseling is essential, too. Below are guidelines for these, for men ages 65 and older. Talk with your healthcare provider to make sure you’re up-to-date on what you need.  Screening Who needs it How often   Abdominal aortic aneurysm Men ages 65 to 75 who have ever smoked 1 ultrasound   Alcohol misuse All men in this age group At routine exams   Blood pressure All men in this age group Every 2 years if your blood pressure is less than 120/80 mm Hg; yearly if your systolic blood pressure is 120 to 139 mm Hg, or your diastolic blood pressure reading is 80 to 89 mm Hg   Colorectal cancer All men in this age group Flexible sigmoidoscopy every 5 years, or colonoscopy every 10 years, or double-contrast barium enema every 5 years; yearly fecal occult blood test or fecal immunochemical test; or a stool DNA test as often as your healthcare provider advises; talk with your healthcare provider about which tests are best for you and when you no  longer need colonoscopies (generally after age 75)   Depression All men in this age group At routine exams   Type 2 diabetes or prediabetes All adults beginning at age 45 and adults without symptoms at any age who are overweight or obese and have 1 or more other risk factors for diabetes At least every 3 years (yearly if your blood sugar has already begun to rise)   Hepatitis C Men at increased risk for infection - talk with your healthcare provider At routine exams   High cholesterol or triglycerides All men in this age group At least every 5 years   HIV Men at increased risk for infection - talk with your healthcare provider At routine exams   Lung cancer Adults ages 55 to 80 who have smoked Yearly screening in smokers with 30 pack-year history of smoking or who quit within 15 years   Obesity All men in this age group At routine exams   Prostate cancer All men in this age group, talk to healthcare provider about risks and benefits of digital rectal exam (ABRAM) and prostate-specific antigen (PSA) screening1 At routine exams   Syphilis Men at increased risk for infection - talk with your healthcare provider At routine exams   Tuberculosis Men at increased risk for infection - talk with your healthcare provider Ask your healthcare provider   Vision All men in this age group Every 1 to 2 years; if you have a chronic health condition, ask your healthcare provider if you needs exams more often   Vaccine Who needs it How often   Chickenpox (varicella) All men in this age group who have no record of this infection or vaccine 2 doses; second dose should be given at least 4 weeks after the first dose   Hepatitis A Men at increased risk for infection - talk with your healthcare provider 2 doses given at least 6 months apart   Hepatitis B Men at increased risk for infection - talk with your healthcare provider 3 doses over 6 months; second dose should be given 1 month after the first dose; the third dose should be given at  least 2 months after the second dose and at least 4 months after the first dose   Haemophilus influenzae Type B (HIB) Men at increased risk for infection - talk with your healthcare provider 1 to 3 doses   Influenza (flu) All men in this age group  Once a year   Meningococcal Men at increased risk for infection - talk with your healthcare provider 1 or more doses   Pneumococcal conjugate vaccine (PCV13) and pneumococcal polysaccharide vaccine (PPSV23) All men in this age group 1 dose of each vaccine   Tetanus/diphtheria/  pertussis (Td/Tdap) booster All men in this age group Td every 10 years, or Tdap if you will have contact with a child younger than 12 months old   Zoster All men in this age group 1 dose   Counseling Who needs it How often   Diet and exercise Men who are overweight or obese When diagnosed, and then at routine exams   Fall prevention (exercise, vitamin D supplements) All men in this age group At routine exams   Sexually transmitted infection Men at increased risk for infection - talk with your healthcare provider At routine exams   Use of daily aspirin Men ages 45 to 79 at risk for cardiovascular health problems At routine exams   Use of tobacco and the health effects it can cause All men in this age group Every visit   [address] [place] [address] [facility]   Date Last Reviewed: 2/1/2017  © 6092-8446 [employer], [employer]. [address] [place] [address], [place] [address]. All rights reserved. This information is not intended as a substitute for professional medical care. Always follow your healthcare professional's instructions.

## 2025-04-11 ENCOUNTER — LAB ENCOUNTER (OUTPATIENT)
Dept: LAB | Age: 75
End: 2025-04-11
Attending: EMERGENCY MEDICINE
Payer: MEDICARE

## 2025-04-11 DIAGNOSIS — E78.00 HYPERCHOLESTEROLEMIA: ICD-10-CM

## 2025-04-11 DIAGNOSIS — E79.0 HYPERURICEMIA: ICD-10-CM

## 2025-04-11 DIAGNOSIS — Z00.00 ENCOUNTER FOR ANNUAL PHYSICAL EXAM: ICD-10-CM

## 2025-04-11 DIAGNOSIS — Z87.39 HISTORY OF GOUT: ICD-10-CM

## 2025-04-11 DIAGNOSIS — I10 ESSENTIAL HYPERTENSION: ICD-10-CM

## 2025-04-11 DIAGNOSIS — Z12.5 SCREENING FOR PROSTATE CANCER: ICD-10-CM

## 2025-04-11 LAB
ALBUMIN SERPL-MCNC: 4.7 G/DL (ref 3.2–4.8)
ALBUMIN/GLOB SERPL: 1.6 {RATIO} (ref 1–2)
ALP LIVER SERPL-CCNC: 69 U/L (ref 45–117)
ALT SERPL-CCNC: 10 U/L (ref 10–49)
ANION GAP SERPL CALC-SCNC: 11 MMOL/L (ref 0–18)
AST SERPL-CCNC: 16 U/L (ref ?–34)
BASOPHILS # BLD AUTO: 0.04 X10(3) UL (ref 0–0.2)
BASOPHILS NFR BLD AUTO: 0.5 %
BILIRUB SERPL-MCNC: 0.6 MG/DL (ref 0.2–1.1)
BUN BLD-MCNC: 27 MG/DL (ref 9–23)
CALCIUM BLD-MCNC: 9.6 MG/DL (ref 8.7–10.6)
CHLORIDE SERPL-SCNC: 103 MMOL/L (ref 98–112)
CHOLEST SERPL-MCNC: 142 MG/DL (ref ?–200)
CO2 SERPL-SCNC: 24 MMOL/L (ref 21–32)
CREAT BLD-MCNC: 1.81 MG/DL (ref 0.7–1.3)
EGFRCR SERPLBLD CKD-EPI 2021: 38 ML/MIN/1.73M2 (ref 60–?)
EOSINOPHIL # BLD AUTO: 0.25 X10(3) UL (ref 0–0.7)
EOSINOPHIL NFR BLD AUTO: 3.2 %
ERYTHROCYTE [DISTWIDTH] IN BLOOD BY AUTOMATED COUNT: 15.2 %
FASTING PATIENT LIPID ANSWER: YES
FASTING STATUS PATIENT QL REPORTED: YES
GLOBULIN PLAS-MCNC: 2.9 G/DL (ref 2–3.5)
GLUCOSE BLD-MCNC: 97 MG/DL (ref 70–99)
HCT VFR BLD AUTO: 38.2 % (ref 39–53)
HDLC SERPL-MCNC: 41 MG/DL (ref 40–59)
HGB BLD-MCNC: 12.5 G/DL (ref 13–17.5)
IMM GRANULOCYTES # BLD AUTO: 0.02 X10(3) UL (ref 0–1)
IMM GRANULOCYTES NFR BLD: 0.3 %
LDLC SERPL CALC-MCNC: 75 MG/DL (ref ?–100)
LYMPHOCYTES # BLD AUTO: 2.05 X10(3) UL (ref 1–4)
LYMPHOCYTES NFR BLD AUTO: 25.9 %
MCH RBC QN AUTO: 28.5 PG (ref 26–34)
MCHC RBC AUTO-ENTMCNC: 32.7 G/DL (ref 31–37)
MCV RBC AUTO: 87.2 FL (ref 80–100)
MONOCYTES # BLD AUTO: 0.6 X10(3) UL (ref 0.1–1)
MONOCYTES NFR BLD AUTO: 7.6 %
NEUTROPHILS # BLD AUTO: 4.96 X10 (3) UL (ref 1.5–7.7)
NEUTROPHILS # BLD AUTO: 4.96 X10(3) UL (ref 1.5–7.7)
NEUTROPHILS NFR BLD AUTO: 62.5 %
NONHDLC SERPL-MCNC: 101 MG/DL (ref ?–130)
OSMOLALITY SERPL CALC.SUM OF ELEC: 291 MOSM/KG (ref 275–295)
PLATELET # BLD AUTO: 187 10(3)UL (ref 150–450)
POTASSIUM SERPL-SCNC: 4.2 MMOL/L (ref 3.5–5.1)
PROT SERPL-MCNC: 7.6 G/DL (ref 5.7–8.2)
RBC # BLD AUTO: 4.38 X10(6)UL (ref 3.8–5.8)
SODIUM SERPL-SCNC: 138 MMOL/L (ref 136–145)
TRIGL SERPL-MCNC: 150 MG/DL (ref 30–149)
TSI SER-ACNC: 3.07 UIU/ML (ref 0.55–4.78)
URATE SERPL-MCNC: 7.4 MG/DL (ref 3.7–9.2)
VLDLC SERPL CALC-MCNC: 23 MG/DL (ref 0–30)
WBC # BLD AUTO: 7.9 X10(3) UL (ref 4–11)

## 2025-04-11 PROCEDURE — 80061 LIPID PANEL: CPT

## 2025-04-11 PROCEDURE — 36415 COLL VENOUS BLD VENIPUNCTURE: CPT

## 2025-04-11 PROCEDURE — 84443 ASSAY THYROID STIM HORMONE: CPT

## 2025-04-11 PROCEDURE — 80053 COMPREHEN METABOLIC PANEL: CPT

## 2025-04-11 PROCEDURE — 84550 ASSAY OF BLOOD/URIC ACID: CPT

## 2025-04-11 PROCEDURE — 85025 COMPLETE CBC W/AUTO DIFF WBC: CPT

## 2025-04-22 DIAGNOSIS — M10.9 ACUTE GOUT OF LEFT ANKLE, UNSPECIFIED CAUSE: ICD-10-CM

## 2025-04-22 NOTE — TELEPHONE ENCOUNTER
DAMION requesting Medication Refill for:    Medication name and dose (copy and paste from medication list):       ALLOPURINOL 100 MG TABLET   LF 2/10/25    LOV: 4/10/2025   Next Scheduled appointment: 10/10/2025

## 2025-04-22 NOTE — TELEPHONE ENCOUNTER
6 month supply of refills good until 08/10/2025:    Jin  requesting     Outpatient Medication Detail     Disp Refills Start End    allopurinol 100 MG Oral Tab 90 tablet 1 2/10/2025 --    Sig - Route: Take 1 tablet (100 mg total) by mouth daily. - Oral    Sent to pharmacy as: Allopurinol 100 MG Oral Tablet (Zyloprim)    E-Prescribing Status: Receipt confirmed by pharmacy (2/10/2025  8:41 AM CST)      Associated Diagnoses    Acute gout of left ankle, unspecified cause  - Primary        Pharmacy    [facility] [id number] [facility] ([facility] [address] [phone], [phone]

## 2025-04-25 RX ORDER — ALLOPURINOL 100 MG/1
100 TABLET ORAL DAILY
Qty: 90 TABLET | Refills: 0 | Status: SHIPPED | OUTPATIENT
Start: 2025-04-25

## 2025-04-25 NOTE — TELEPHONE ENCOUNTER
Please review; protocol failed/No Protocol      Sending what is left on the script patient requesting different pharmacy

## 2025-05-15 DIAGNOSIS — M10.9 ACUTE GOUT OF LEFT ANKLE, UNSPECIFIED CAUSE: ICD-10-CM

## 2025-05-15 NOTE — TELEPHONE ENCOUNTER
Glen Morales requesting Medication Refill for:    Medication name and dose (copy and paste from medication list): predniSONE 10 MG Oral Tab     If medication is not on medication list - transfer patient to RN queue for triage    Preferred Pharmacy: [facility] - [place], [place]     LOV: 4/10/2025   Last Refill date: 2/10/25  Next Scheduled appointment: 10/10/2025

## 2025-05-16 NOTE — TELEPHONE ENCOUNTER
Please review; protocol failed/ has no protocol      Is refill appropriate ?    Goldy Riversa22 hours ago (11:55 AM)     [facility] requesting Medication Refill for:     Medication name and dose (copy and paste from medication list): predniSONE 10 MG Oral Tab      If medication is not on medication list - transfer patient to RN queue for triage     Preferred Pharmacy: [facility] - [place], [place]      LOV: 4/10/2025   Last Refill date: 2/10/25  Next Scheduled appointment: 10/10/2025

## 2025-05-20 RX ORDER — PREDNISONE 10 MG/1
TABLET ORAL
Qty: 30 TABLET | Refills: 0 | OUTPATIENT
Start: 2025-05-20 | End: 2025-06-01

## 2025-05-27 ENCOUNTER — TELEPHONE (OUTPATIENT)
Dept: FAMILY MEDICINE CLINIC | Facility: CLINIC | Age: 75
End: 2025-05-27

## 2025-07-01 DIAGNOSIS — M10.9 ACUTE GOUT OF LEFT ANKLE, UNSPECIFIED CAUSE: ICD-10-CM

## 2025-07-02 RX ORDER — PREDNISONE 10 MG/1
TABLET ORAL
Qty: 30 TABLET | Refills: 0 | Status: SHIPPED | OUTPATIENT
Start: 2025-07-02

## 2025-07-12 DIAGNOSIS — I10 ESSENTIAL HYPERTENSION: ICD-10-CM

## 2025-07-12 DIAGNOSIS — M10.9 ACUTE GOUT OF LEFT ANKLE, UNSPECIFIED CAUSE: ICD-10-CM

## 2025-07-16 NOTE — TELEPHONE ENCOUNTER
For replies, please route to pool: [facility] CENTRAL REFILLS    Please review: medication fails/has no protocol attached.    Future Appointments   Date Time Provider Department Center   10/10/2025  8:00 AM Roxie Matta MD [facility] [place]

## 2025-07-20 RX ORDER — ALLOPURINOL 100 MG/1
100 TABLET ORAL DAILY
Qty: 90 TABLET | Refills: 0 | Status: SHIPPED | OUTPATIENT
Start: 2025-07-20

## 2025-07-20 RX ORDER — AMLODIPINE BESYLATE 5 MG/1
5 TABLET ORAL DAILY
Qty: 90 TABLET | Refills: 0 | Status: SHIPPED | OUTPATIENT
Start: 2025-07-20

## 2025-07-24 DIAGNOSIS — M10.9 ACUTE GOUT OF LEFT ANKLE, UNSPECIFIED CAUSE: ICD-10-CM

## 2025-07-24 RX ORDER — PREDNISONE 10 MG/1
TABLET ORAL
Qty: 30 TABLET | Refills: 0 | Status: CANCELLED | OUTPATIENT
Start: 2025-07-24

## 2025-07-24 NOTE — TELEPHONE ENCOUNTER
Received call from [facility] checking on status of prednisone refill.     Last seen for acute gout 2/10/25. Attempted to call pt with  Jose M ID [id number] to discuss if he is having acute flare. This was refilled 7/2/25. Left message to call back.

## 2025-07-25 NOTE — TELEPHONE ENCOUNTER
Spoke to Ashley at [facility] to advise I spoke with patient and advised he will need to call if/when he needs prednisone.  Ashley canceled the refill request

## 2025-08-20 DIAGNOSIS — N52.9 ERECTILE DYSFUNCTION, UNSPECIFIED ERECTILE DYSFUNCTION TYPE: ICD-10-CM

## 2025-08-25 ENCOUNTER — TELEPHONE (OUTPATIENT)
Dept: FAMILY MEDICINE CLINIC | Facility: CLINIC | Age: 75
End: 2025-08-25

## 2025-08-25 RX ORDER — SILDENAFIL 50 MG/1
50 TABLET, FILM COATED ORAL DAILY PRN
Qty: 18 TABLET | Refills: 3 | Status: SHIPPED | OUTPATIENT
Start: 2025-08-25

## (undated) DIAGNOSIS — I10 ESSENTIAL HYPERTENSION: ICD-10-CM

## (undated) DIAGNOSIS — N52.9 ERECTILE DYSFUNCTION, UNSPECIFIED ERECTILE DYSFUNCTION TYPE: ICD-10-CM

## (undated) DIAGNOSIS — E78.00 HYPERCHOLESTEROLEMIA: ICD-10-CM

## (undated) NOTE — LETTER
06/24/19    Dear Chaparro Laura MD,    I am seeing Kristina Caldera in the office today after colonoscopy in 5/20/2019.             Assessment   Tubular adenoma of colon  (primary encounter diagnosis)  Diverticulosis of large intestine without hemorrhage

## (undated) NOTE — LETTER
August 16, 2022    Ar[address] [place] [id number]      Dear Ba Erazo: The following are the results of your recent tests. Please review the list of test results. Dr. Denver Arzola has reviewed your results and your creatinine and BUN are slightly elevated. Please work on proper hydration. Aim for 64 oz water daily. She will have you repeat this lab in 1 month. Your cholesterol is not at goal and she will have you INCREASE your atorvastatin to 20mg at bedtime. Repeat this lab in 1 month. A new prescription will be sent to your pharmacy. Results for orders placed or performed in visit on 02/01/26   COMP METABOLIC PANEL (14)   Result Value Ref Range    Glucose 89 70 - 99 mg/dL    Sodium 134 (L) 136 - 145 mmol/L    Potassium 4.8 3.5 - 5.1 mmol/L    Chloride 99 98 - 112 mmol/L    CO2 28.0 21.0 - 32.0 mmol/L    Anion Gap 7 0 - 18 mmol/L    BUN 29 (H) 7 - 18 mg/dL    Creatinine 1.68 (H) 0.70 - 1.30 mg/dL    Calcium, Total 9.5 8.5 - 10.1 mg/dL    Calculated Osmolality 283 275 - 295 mOsm/kg    eGFR-Cr 42 (L) >=60 mL/min/1.73m2    AST 26 15 - 37 U/L    ALT 33 16 - 61 U/L    Alkaline Phosphatase 79 45 - 117 U/L    Bilirubin, Total 0.7 0.1 - 2.0 mg/dL    Total Protein 8.4 (H) 6.4 - 8.2 g/dL    Albumin 4.0 3.4 - 5.0 g/dL    Globulin  4.4 2.8 - 4.4 g/dL    A/G Ratio 0.9 (L) 1.0 - 2.0    Patient Fasting for CMP? Yes    LIPID PANEL   Result Value Ref Range    Cholesterol, Total 185 <200 mg/dL    HDL Cholesterol 54 40 - 59 mg/dL    Triglycerides 150 (H) 30 - 149 mg/dL    LDL Cholesterol 105 (H) <100 mg/dL    VLDL 25 0 - 30 mg/dL    Non HDL Chol 131 (H) <130 mg/dL    Patient Fasting for Lipid?  Yes        Please call if you have further questions,    Sincerely    JR Cherry/Dr. Denver Arzola

## (undated) NOTE — LETTER
05/03/24        Dr Sigrid Hanna reviewed your results from the FIT card to check for hidden blood in the stool and it was negative.  Repeat this test in 1 year or consider a colonoscopy.      Sincerely      JR Cherry

## (undated) NOTE — Clinical Note
Initial assessment completed with patient.  Appointment scheduled for 10/11/24.  Patient agrees to additional follow-up calls from nurse care manager.  Thank you!

## (undated) NOTE — LETTER
19    Patient: Kristina Caldera  : 1950 Visit date: 2019    Dear  Dr. Nancy Bojorquez MD,    Thank you for referring Kristina Caldera to my practice. Please find my assessment and plan below.                 Assessment   Encounter for screening co

## (undated) NOTE — LETTER
06/29/19        Nena Skelton  [phone] [place] [id number]      Dear Miri Wilson,    [facility] records indicate that you have outstanding lab work and or testing that was ordered for you and has not yet been completed:  Orders Placed This Encounter      B